# Patient Record
Sex: MALE | Employment: OTHER | URBAN - METROPOLITAN AREA
[De-identification: names, ages, dates, MRNs, and addresses within clinical notes are randomized per-mention and may not be internally consistent; named-entity substitution may affect disease eponyms.]

---

## 2017-03-29 ENCOUNTER — OFFICE VISIT - HEALTHEAST (OUTPATIENT)
Dept: INTERNAL MEDICINE | Facility: CLINIC | Age: 67
End: 2017-03-29

## 2017-03-29 DIAGNOSIS — M25.561 CHRONIC PAIN OF BOTH KNEES: ICD-10-CM

## 2017-03-29 DIAGNOSIS — Z00.00 HEALTH MAINTENANCE EXAMINATION: ICD-10-CM

## 2017-03-29 DIAGNOSIS — E78.00 PURE HYPERCHOLESTEROLEMIA: ICD-10-CM

## 2017-03-29 DIAGNOSIS — G47.01 INSOMNIA DUE TO MEDICAL CONDITION: ICD-10-CM

## 2017-03-29 DIAGNOSIS — J32.9 CHRONIC SINUSITIS, UNSPECIFIED LOCATION: ICD-10-CM

## 2017-03-29 DIAGNOSIS — G89.29 CHRONIC PAIN OF BOTH KNEES: ICD-10-CM

## 2017-03-29 DIAGNOSIS — M25.562 CHRONIC PAIN OF BOTH KNEES: ICD-10-CM

## 2017-03-29 LAB
CHOLEST SERPL-MCNC: 237 MG/DL
FASTING STATUS PATIENT QL REPORTED: YES
HDLC SERPL-MCNC: 51 MG/DL
LDLC SERPL CALC-MCNC: 156 MG/DL
PSA SERPL-MCNC: 2 NG/ML (ref 0–4.5)
TRIGL SERPL-MCNC: 148 MG/DL

## 2017-03-29 ASSESSMENT — MIFFLIN-ST. JEOR: SCORE: 1537.15

## 2017-04-07 ENCOUNTER — COMMUNICATION - HEALTHEAST (OUTPATIENT)
Dept: INTERNAL MEDICINE | Facility: CLINIC | Age: 67
End: 2017-04-07

## 2017-04-11 ENCOUNTER — AMBULATORY - HEALTHEAST (OUTPATIENT)
Dept: LAB | Facility: CLINIC | Age: 67
End: 2017-04-11

## 2017-04-11 ENCOUNTER — COMMUNICATION - HEALTHEAST (OUTPATIENT)
Dept: INTERNAL MEDICINE | Facility: CLINIC | Age: 67
End: 2017-04-11

## 2017-04-11 DIAGNOSIS — Z12.11 COLON CANCER SCREENING: ICD-10-CM

## 2017-04-21 ENCOUNTER — RECORDS - HEALTHEAST (OUTPATIENT)
Dept: ADMINISTRATIVE | Facility: OTHER | Age: 67
End: 2017-04-21

## 2017-04-21 ENCOUNTER — COMMUNICATION - HEALTHEAST (OUTPATIENT)
Dept: INTERNAL MEDICINE | Facility: CLINIC | Age: 67
End: 2017-04-21

## 2017-09-19 ENCOUNTER — OFFICE VISIT - HEALTHEAST (OUTPATIENT)
Dept: INTERNAL MEDICINE | Facility: CLINIC | Age: 67
End: 2017-09-19

## 2017-09-19 DIAGNOSIS — N40.1 BENIGN NON-NODULAR PROSTATIC HYPERPLASIA WITH LOWER URINARY TRACT SYMPTOMS: ICD-10-CM

## 2017-09-19 DIAGNOSIS — M54.6 CHRONIC BILATERAL THORACIC BACK PAIN: ICD-10-CM

## 2017-09-19 DIAGNOSIS — G89.29 CHRONIC BILATERAL THORACIC BACK PAIN: ICD-10-CM

## 2017-09-21 ENCOUNTER — COMMUNICATION - HEALTHEAST (OUTPATIENT)
Dept: INTERNAL MEDICINE | Facility: CLINIC | Age: 67
End: 2017-09-21

## 2017-09-26 ENCOUNTER — OFFICE VISIT - HEALTHEAST (OUTPATIENT)
Dept: PHYSICAL THERAPY | Facility: REHABILITATION | Age: 67
End: 2017-09-26

## 2017-09-26 DIAGNOSIS — M62.81 GENERALIZED MUSCLE WEAKNESS: ICD-10-CM

## 2017-09-26 DIAGNOSIS — M54.6 PAIN IN THORACIC SPINE: ICD-10-CM

## 2017-09-26 DIAGNOSIS — R29.3 POOR POSTURE: ICD-10-CM

## 2018-02-13 ENCOUNTER — OFFICE VISIT - HEALTHEAST (OUTPATIENT)
Dept: INTERNAL MEDICINE | Facility: CLINIC | Age: 68
End: 2018-02-13

## 2018-02-13 DIAGNOSIS — L91.8 SKIN TAG: ICD-10-CM

## 2018-02-13 DIAGNOSIS — Z12.11 COLON CANCER SCREENING: ICD-10-CM

## 2018-02-13 ASSESSMENT — MIFFLIN-ST. JEOR: SCORE: 1553.03

## 2018-02-14 LAB
LAB AP CHARGES (HE HISTORICAL CONVERSION): NORMAL
PATH REPORT.COMMENTS IMP SPEC: NORMAL
PATH REPORT.FINAL DX SPEC: NORMAL
PATH REPORT.GROSS SPEC: NORMAL
PATH REPORT.MICROSCOPIC SPEC OTHER STN: NORMAL
PATH REPORT.RELEVANT HX SPEC: NORMAL
RESULT FLAG (HE HISTORICAL CONVERSION): NORMAL

## 2018-02-15 ENCOUNTER — COMMUNICATION - HEALTHEAST (OUTPATIENT)
Dept: INTERNAL MEDICINE | Facility: CLINIC | Age: 68
End: 2018-02-15

## 2018-02-28 ENCOUNTER — COMMUNICATION - HEALTHEAST (OUTPATIENT)
Dept: INTERNAL MEDICINE | Facility: CLINIC | Age: 68
End: 2018-02-28

## 2018-04-10 ENCOUNTER — AMBULATORY - HEALTHEAST (OUTPATIENT)
Dept: LAB | Facility: CLINIC | Age: 68
End: 2018-04-10

## 2018-04-10 DIAGNOSIS — Z12.11 SCREEN FOR COLON CANCER: ICD-10-CM

## 2018-04-10 LAB
HEMOCCULT SP1 STL QL: NEGATIVE
HEMOCCULT SP2 STL QL: NEGATIVE
HEMOCCULT SP3 STL QL: NEGATIVE

## 2018-04-11 ENCOUNTER — COMMUNICATION - HEALTHEAST (OUTPATIENT)
Dept: INTERNAL MEDICINE | Facility: CLINIC | Age: 68
End: 2018-04-11

## 2018-05-14 ENCOUNTER — OFFICE VISIT - HEALTHEAST (OUTPATIENT)
Dept: INTERNAL MEDICINE | Facility: CLINIC | Age: 68
End: 2018-05-14

## 2018-05-14 ENCOUNTER — RECORDS - HEALTHEAST (OUTPATIENT)
Dept: ADMINISTRATIVE | Facility: OTHER | Age: 68
End: 2018-05-14

## 2018-05-14 DIAGNOSIS — Z12.5 SCREENING FOR MALIGNANT NEOPLASM OF PROSTATE: ICD-10-CM

## 2018-05-14 DIAGNOSIS — Z13.1 ENCOUNTER FOR SCREENING FOR DIABETES MELLITUS: ICD-10-CM

## 2018-05-14 DIAGNOSIS — Z13.220 ENCOUNTER FOR SCREENING FOR LIPOID DISORDERS: ICD-10-CM

## 2018-05-14 DIAGNOSIS — H91.90 HEARING LOSS: ICD-10-CM

## 2018-05-14 DIAGNOSIS — Z00.00 ROUTINE GENERAL MEDICAL EXAMINATION AT A HEALTH CARE FACILITY: ICD-10-CM

## 2018-05-14 DIAGNOSIS — L98.9 SKIN LESION: ICD-10-CM

## 2018-05-14 LAB
CHOLEST SERPL-MCNC: 218 MG/DL
FASTING STATUS PATIENT QL REPORTED: YES
FASTING STATUS PATIENT QL REPORTED: YES
GLUCOSE BLD-MCNC: 111 MG/DL (ref 70–99)
HDLC SERPL-MCNC: 56 MG/DL
LDLC SERPL CALC-MCNC: 149 MG/DL
PSA SERPL-MCNC: 0.9 NG/ML (ref 0–4.5)
TRIGL SERPL-MCNC: 64 MG/DL

## 2018-05-14 ASSESSMENT — MIFFLIN-ST. JEOR: SCORE: 1526.47

## 2018-05-16 ENCOUNTER — OFFICE VISIT - HEALTHEAST (OUTPATIENT)
Dept: AUDIOLOGY | Facility: CLINIC | Age: 68
End: 2018-05-16

## 2018-05-16 DIAGNOSIS — H90.3 SENSORINEURAL HEARING LOSS, BILATERAL: ICD-10-CM

## 2018-05-16 DIAGNOSIS — H92.01 EAR PAIN, RIGHT: ICD-10-CM

## 2018-05-16 DIAGNOSIS — H93.11 TINNITUS OF RIGHT EAR: ICD-10-CM

## 2018-05-18 ENCOUNTER — COMMUNICATION - HEALTHEAST (OUTPATIENT)
Dept: INTERNAL MEDICINE | Facility: CLINIC | Age: 68
End: 2018-05-18

## 2018-05-30 ENCOUNTER — OFFICE VISIT - HEALTHEAST (OUTPATIENT)
Dept: OTOLARYNGOLOGY | Facility: CLINIC | Age: 68
End: 2018-05-30

## 2018-05-30 DIAGNOSIS — H93.13 TINNITUS OF BOTH EARS: ICD-10-CM

## 2018-05-30 DIAGNOSIS — H90.3 SENSORINEURAL HEARING LOSS (SNHL) OF BOTH EARS: ICD-10-CM

## 2018-08-17 ENCOUNTER — OFFICE VISIT - HEALTHEAST (OUTPATIENT)
Dept: INTERNAL MEDICINE | Facility: CLINIC | Age: 68
End: 2018-08-17

## 2018-08-17 ENCOUNTER — COMMUNICATION - HEALTHEAST (OUTPATIENT)
Dept: SCHEDULING | Facility: CLINIC | Age: 68
End: 2018-08-17

## 2018-08-17 DIAGNOSIS — R30.0 DYSURIA: ICD-10-CM

## 2018-08-17 DIAGNOSIS — R26.89 LOSS OF BALANCE: ICD-10-CM

## 2018-08-17 LAB
ALBUMIN UR-MCNC: NEGATIVE MG/DL
ANION GAP SERPL CALCULATED.3IONS-SCNC: 14 MMOL/L (ref 5–18)
APPEARANCE UR: CLEAR
BILIRUB UR QL STRIP: NEGATIVE
BUN SERPL-MCNC: 13 MG/DL (ref 8–22)
CALCIUM SERPL-MCNC: 9.1 MG/DL (ref 8.5–10.5)
CHLORIDE BLD-SCNC: 93 MMOL/L (ref 98–107)
CO2 SERPL-SCNC: 21 MMOL/L (ref 22–31)
COLOR UR AUTO: YELLOW
CREAT SERPL-MCNC: 0.85 MG/DL (ref 0.7–1.3)
GFR SERPL CREATININE-BSD FRML MDRD: >60 ML/MIN/1.73M2
GLUCOSE BLD-MCNC: 102 MG/DL (ref 70–125)
GLUCOSE UR STRIP-MCNC: NEGATIVE MG/DL
HGB BLD-MCNC: 12.5 G/DL (ref 14–18)
HGB UR QL STRIP: NEGATIVE
KETONES UR STRIP-MCNC: NEGATIVE MG/DL
LEUKOCYTE ESTERASE UR QL STRIP: NEGATIVE
NITRATE UR QL: NEGATIVE
PH UR STRIP: 7.5 [PH] (ref 4.5–8)
POTASSIUM BLD-SCNC: 4.1 MMOL/L (ref 3.5–5)
SODIUM SERPL-SCNC: 128 MMOL/L (ref 136–145)
SP GR UR STRIP: 1.01 (ref 1–1.03)
UROBILINOGEN UR STRIP-ACNC: NORMAL

## 2018-08-17 ASSESSMENT — MIFFLIN-ST. JEOR: SCORE: 1535.52

## 2018-08-22 ENCOUNTER — COMMUNICATION - HEALTHEAST (OUTPATIENT)
Dept: INTERNAL MEDICINE | Facility: CLINIC | Age: 68
End: 2018-08-22

## 2018-08-28 ENCOUNTER — OFFICE VISIT - HEALTHEAST (OUTPATIENT)
Dept: INTERNAL MEDICINE | Facility: CLINIC | Age: 68
End: 2018-08-28

## 2018-08-28 DIAGNOSIS — E87.1 HYPONATREMIA: ICD-10-CM

## 2018-08-28 LAB
ANION GAP SERPL CALCULATED.3IONS-SCNC: 10 MMOL/L (ref 5–18)
BUN SERPL-MCNC: 17 MG/DL (ref 8–22)
CALCIUM SERPL-MCNC: 10.1 MG/DL (ref 8.5–10.5)
CHLORIDE BLD-SCNC: 100 MMOL/L (ref 98–107)
CO2 SERPL-SCNC: 25 MMOL/L (ref 22–31)
CREAT SERPL-MCNC: 0.95 MG/DL (ref 0.7–1.3)
GFR SERPL CREATININE-BSD FRML MDRD: >60 ML/MIN/1.73M2
GLUCOSE BLD-MCNC: 110 MG/DL (ref 70–125)
OSMOLALITY SERPL: 285 MOSM/KG (ref 270–300)
POTASSIUM BLD-SCNC: 4.3 MMOL/L (ref 3.5–5)
SODIUM SERPL-SCNC: 135 MMOL/L (ref 136–145)

## 2018-08-28 ASSESSMENT — MIFFLIN-ST. JEOR: SCORE: 1515.1

## 2018-08-29 ENCOUNTER — COMMUNICATION - HEALTHEAST (OUTPATIENT)
Dept: INTERNAL MEDICINE | Facility: CLINIC | Age: 68
End: 2018-08-29

## 2019-05-10 ENCOUNTER — COMMUNICATION - HEALTHEAST (OUTPATIENT)
Dept: INTERNAL MEDICINE | Facility: CLINIC | Age: 69
End: 2019-05-10

## 2019-05-10 DIAGNOSIS — E78.00 PURE HYPERCHOLESTEROLEMIA: ICD-10-CM

## 2019-05-11 ENCOUNTER — COMMUNICATION - HEALTHEAST (OUTPATIENT)
Dept: INTERNAL MEDICINE | Facility: CLINIC | Age: 69
End: 2019-05-11

## 2019-05-11 DIAGNOSIS — N40.1 BENIGN NON-NODULAR PROSTATIC HYPERPLASIA WITH LOWER URINARY TRACT SYMPTOMS: ICD-10-CM

## 2019-05-16 ENCOUNTER — OFFICE VISIT - HEALTHEAST (OUTPATIENT)
Dept: INTERNAL MEDICINE | Facility: CLINIC | Age: 69
End: 2019-05-16

## 2019-05-16 DIAGNOSIS — Z00.00 ROUTINE GENERAL MEDICAL EXAMINATION AT A HEALTH CARE FACILITY: ICD-10-CM

## 2019-05-16 DIAGNOSIS — R97.20 ELEVATED PROSTATE SPECIFIC ANTIGEN (PSA): ICD-10-CM

## 2019-05-16 DIAGNOSIS — E87.1 HYPONATREMIA: ICD-10-CM

## 2019-05-16 DIAGNOSIS — N40.1 BENIGN NON-NODULAR PROSTATIC HYPERPLASIA WITH LOWER URINARY TRACT SYMPTOMS: ICD-10-CM

## 2019-05-16 DIAGNOSIS — Z12.11 SCREEN FOR COLON CANCER: ICD-10-CM

## 2019-05-16 DIAGNOSIS — Z12.5 SCREENING FOR MALIGNANT NEOPLASM OF PROSTATE: ICD-10-CM

## 2019-05-16 DIAGNOSIS — E78.00 PURE HYPERCHOLESTEROLEMIA: ICD-10-CM

## 2019-05-16 LAB
ALBUMIN SERPL-MCNC: 4.3 G/DL (ref 3.5–5)
ALP SERPL-CCNC: 91 U/L (ref 45–120)
ALT SERPL W P-5'-P-CCNC: 20 U/L (ref 0–45)
ANION GAP SERPL CALCULATED.3IONS-SCNC: 11 MMOL/L (ref 5–18)
AST SERPL W P-5'-P-CCNC: 28 U/L (ref 0–40)
BILIRUB DIRECT SERPL-MCNC: 0.3 MG/DL
BILIRUB SERPL-MCNC: 0.9 MG/DL (ref 0–1)
BUN SERPL-MCNC: 26 MG/DL (ref 8–22)
CALCIUM SERPL-MCNC: 10.2 MG/DL (ref 8.5–10.5)
CHLORIDE BLD-SCNC: 99 MMOL/L (ref 98–107)
CO2 SERPL-SCNC: 24 MMOL/L (ref 22–31)
CREAT SERPL-MCNC: 1.12 MG/DL (ref 0.7–1.3)
ERYTHROCYTE [DISTWIDTH] IN BLOOD BY AUTOMATED COUNT: 11.2 % (ref 11–14.5)
GFR SERPL CREATININE-BSD FRML MDRD: >60 ML/MIN/1.73M2
GLUCOSE BLD-MCNC: 97 MG/DL (ref 70–125)
HCT VFR BLD AUTO: 39.6 % (ref 40–54)
HGB BLD-MCNC: 13.4 G/DL (ref 14–18)
LDLC SERPL CALC-MCNC: 86 MG/DL
MCH RBC QN AUTO: 33.5 PG (ref 27–34)
MCHC RBC AUTO-ENTMCNC: 33.8 G/DL (ref 32–36)
MCV RBC AUTO: 99 FL (ref 80–100)
PLATELET # BLD AUTO: 255 THOU/UL (ref 140–440)
PMV BLD AUTO: 6.5 FL (ref 7–10)
POTASSIUM BLD-SCNC: 4 MMOL/L (ref 3.5–5)
PROT SERPL-MCNC: 7.6 G/DL (ref 6–8)
PSA SERPL-MCNC: 5.5 NG/ML (ref 0–4.5)
RBC # BLD AUTO: 4 MILL/UL (ref 4.4–6.2)
SODIUM SERPL-SCNC: 134 MMOL/L (ref 136–145)
WBC: 6.1 THOU/UL (ref 4–11)

## 2019-05-16 ASSESSMENT — MIFFLIN-ST. JEOR
SCORE: 1476.11
SCORE: 1498.79

## 2019-05-17 ENCOUNTER — COMMUNICATION - HEALTHEAST (OUTPATIENT)
Dept: INTERNAL MEDICINE | Facility: CLINIC | Age: 69
End: 2019-05-17

## 2019-05-29 ENCOUNTER — COMMUNICATION - HEALTHEAST (OUTPATIENT)
Dept: INTERNAL MEDICINE | Facility: CLINIC | Age: 69
End: 2019-05-29

## 2019-05-29 DIAGNOSIS — Z12.11 COLON CANCER SCREENING: ICD-10-CM

## 2019-06-10 ENCOUNTER — RECORDS - HEALTHEAST (OUTPATIENT)
Dept: ADMINISTRATIVE | Facility: OTHER | Age: 69
End: 2019-06-10

## 2019-06-14 ENCOUNTER — AMBULATORY - HEALTHEAST (OUTPATIENT)
Dept: LAB | Facility: CLINIC | Age: 69
End: 2019-06-14

## 2019-06-14 DIAGNOSIS — R97.20 ELEVATED PROSTATE SPECIFIC ANTIGEN (PSA): ICD-10-CM

## 2019-06-18 ENCOUNTER — COMMUNICATION - HEALTHEAST (OUTPATIENT)
Dept: INTERNAL MEDICINE | Facility: CLINIC | Age: 69
End: 2019-06-18

## 2019-06-18 ENCOUNTER — AMBULATORY - HEALTHEAST (OUTPATIENT)
Dept: LAB | Facility: CLINIC | Age: 69
End: 2019-06-18

## 2019-06-18 DIAGNOSIS — Z12.11 COLON CANCER SCREENING: ICD-10-CM

## 2019-06-18 LAB
HEMOCCULT SP1 STL QL: NEGATIVE
HEMOCCULT SP2 STL QL: NEGATIVE
HEMOCCULT SP3 STL QL: NEGATIVE

## 2019-07-01 ENCOUNTER — OFFICE VISIT - HEALTHEAST (OUTPATIENT)
Dept: INTERNAL MEDICINE | Facility: CLINIC | Age: 69
End: 2019-07-01

## 2019-07-01 DIAGNOSIS — M54.12 CERVICAL RADICULOPATHY: ICD-10-CM

## 2019-07-01 DIAGNOSIS — R97.20 ELEVATED PROSTATE SPECIFIC ANTIGEN (PSA): ICD-10-CM

## 2019-07-01 LAB — PSA SERPL-MCNC: 1.2 NG/ML (ref 0–4.5)

## 2019-07-01 ASSESSMENT — MIFFLIN-ST. JEOR: SCORE: 1522.35

## 2019-07-02 ENCOUNTER — COMMUNICATION - HEALTHEAST (OUTPATIENT)
Dept: INTERNAL MEDICINE | Facility: CLINIC | Age: 69
End: 2019-07-02

## 2019-07-03 ENCOUNTER — COMMUNICATION - HEALTHEAST (OUTPATIENT)
Dept: INTERNAL MEDICINE | Facility: CLINIC | Age: 69
End: 2019-07-03

## 2019-07-29 ENCOUNTER — OFFICE VISIT - HEALTHEAST (OUTPATIENT)
Dept: INTERNAL MEDICINE | Facility: CLINIC | Age: 69
End: 2019-07-29

## 2019-07-29 DIAGNOSIS — Z01.818 PREOP EXAM FOR INTERNAL MEDICINE: ICD-10-CM

## 2019-07-29 ASSESSMENT — MIFFLIN-ST. JEOR: SCORE: 1512.37

## 2019-09-24 ENCOUNTER — OFFICE VISIT - HEALTHEAST (OUTPATIENT)
Dept: INTERNAL MEDICINE | Facility: CLINIC | Age: 69
End: 2019-09-24

## 2019-09-24 ENCOUNTER — COMMUNICATION - HEALTHEAST (OUTPATIENT)
Dept: SCHEDULING | Facility: CLINIC | Age: 69
End: 2019-09-24

## 2019-09-24 DIAGNOSIS — H10.31 ACUTE BACTERIAL CONJUNCTIVITIS OF RIGHT EYE: ICD-10-CM

## 2019-09-24 DIAGNOSIS — J20.9 ACUTE BRONCHITIS, UNSPECIFIED ORGANISM: ICD-10-CM

## 2019-09-24 ASSESSMENT — MIFFLIN-ST. JEOR: SCORE: 1507.32

## 2020-09-01 ENCOUNTER — COMMUNICATION - HEALTHEAST (OUTPATIENT)
Dept: INTERNAL MEDICINE | Facility: CLINIC | Age: 70
End: 2020-09-01

## 2020-09-01 DIAGNOSIS — N40.1 BENIGN NON-NODULAR PROSTATIC HYPERPLASIA WITH LOWER URINARY TRACT SYMPTOMS: ICD-10-CM

## 2020-09-01 DIAGNOSIS — E78.00 PURE HYPERCHOLESTEROLEMIA: ICD-10-CM

## 2020-09-17 ENCOUNTER — OFFICE VISIT - HEALTHEAST (OUTPATIENT)
Dept: INTERNAL MEDICINE | Facility: CLINIC | Age: 70
End: 2020-09-17

## 2020-09-17 DIAGNOSIS — Z11.59 NEED FOR HEPATITIS C SCREENING TEST: ICD-10-CM

## 2020-09-17 DIAGNOSIS — Z00.00 MEDICARE ANNUAL WELLNESS VISIT, SUBSEQUENT: ICD-10-CM

## 2020-09-17 DIAGNOSIS — E78.00 PURE HYPERCHOLESTEROLEMIA: ICD-10-CM

## 2020-09-17 DIAGNOSIS — M79.10 MUSCLE ACHE: ICD-10-CM

## 2020-09-17 DIAGNOSIS — Z12.5 PROSTATE CANCER SCREENING: ICD-10-CM

## 2020-09-17 DIAGNOSIS — D64.9 ANEMIA, UNSPECIFIED TYPE: ICD-10-CM

## 2020-09-17 DIAGNOSIS — Z12.11 SCREEN FOR COLON CANCER: ICD-10-CM

## 2020-09-17 DIAGNOSIS — R06.83 SNORING: ICD-10-CM

## 2020-09-17 ASSESSMENT — MIFFLIN-ST. JEOR: SCORE: 1531.7

## 2020-11-23 ENCOUNTER — COMMUNICATION - HEALTHEAST (OUTPATIENT)
Dept: INTERNAL MEDICINE | Facility: CLINIC | Age: 70
End: 2020-11-23

## 2020-11-23 DIAGNOSIS — E78.00 PURE HYPERCHOLESTEROLEMIA: ICD-10-CM

## 2020-11-23 DIAGNOSIS — N40.1 BENIGN NON-NODULAR PROSTATIC HYPERPLASIA WITH LOWER URINARY TRACT SYMPTOMS: ICD-10-CM

## 2021-02-22 ENCOUNTER — COMMUNICATION - HEALTHEAST (OUTPATIENT)
Dept: TELEHEALTH | Facility: CLINIC | Age: 71
End: 2021-02-22

## 2021-03-05 ENCOUNTER — COMMUNICATION - HEALTHEAST (OUTPATIENT)
Dept: INTERNAL MEDICINE | Facility: CLINIC | Age: 71
End: 2021-03-05

## 2021-03-05 DIAGNOSIS — N40.1 BENIGN NON-NODULAR PROSTATIC HYPERPLASIA WITH LOWER URINARY TRACT SYMPTOMS: ICD-10-CM

## 2021-03-05 DIAGNOSIS — E78.00 PURE HYPERCHOLESTEROLEMIA: ICD-10-CM

## 2021-03-05 RX ORDER — TAMSULOSIN HYDROCHLORIDE 0.4 MG/1
CAPSULE ORAL
Qty: 90 CAPSULE | Refills: 2 | Status: SHIPPED | OUTPATIENT
Start: 2021-03-05 | End: 2021-11-30

## 2021-03-05 RX ORDER — ATORVASTATIN CALCIUM 20 MG/1
TABLET, FILM COATED ORAL
Qty: 90 TABLET | Refills: 2 | Status: SHIPPED | OUTPATIENT
Start: 2021-03-05 | End: 2021-11-30

## 2021-05-28 ENCOUNTER — RECORDS - HEALTHEAST (OUTPATIENT)
Dept: ADMINISTRATIVE | Facility: CLINIC | Age: 71
End: 2021-05-28

## 2021-05-28 NOTE — TELEPHONE ENCOUNTER
Refill Approved    Rx renewed per Medication Renewal Policy. Medication was last renewed on 5/18/2018.    Brennen Moore, Care [facility] Triage/Med Refill 5/10/2019     Requested Prescriptions   Pending Prescriptions Disp Refills     atorvastatin (LIPITOR) 20 MG tablet [Pharmacy Med Name: ATORVASTATIN 20 MG TABLET] 90 tablet 3     Sig: TAKE 1 TABLET BY MOUTH EVERY DAY       Statins Refill Protocol (Hmg CoA Reductase Inhibitors) Passed - 5/10/2019 12:59 AM        Passed - PCP or prescribing provider visit in past 12 months      Last office visit with prescriber/PCP: 8/28/2018 Misbah Muniz DO OR same dept: 8/28/2018 Misbah Muniz DO OR same specialty: 8/28/2018 Misbah Muniz DO  Last physical: 5/14/2018 Last MTM visit: Visit date not found   Next visit within 3 mo: Visit date not found  Next physical within 3 mo: Visit date not found  Prescriber OR PCP: Misbah Muniz DO  Last diagnosis associated with med order: There are no diagnoses linked to this encounter.  If protocol passes may refill for 12 months if within 3 months of last provider visit (or a total of 15 months).

## 2021-05-28 NOTE — TELEPHONE ENCOUNTER
Refill Approved    Rx renewed per Medication Renewal Policy. Medication was last renewed on 2/28/18, last OV 8/28/18.    Eleni Gilmore, [facility] Triage/Med Refill 5/12/2019     Requested Prescriptions   Pending Prescriptions Disp Refills     tamsulosin (FLOMAX) 0.4 mg cap [Pharmacy Med Name: TAMSULOSIN HCL 0.4 MG CAPSULE] 30 capsule 10     Sig: TAKE 1 CAPSULE (0.4 MG TOTAL) BY MOUTH DAILY AFTER SUPPER.       Alfuzosin/Tamsulosin/Silodosin Refill Protocol  Passed - 5/11/2019  8:19 AM        Passed - PCP or prescribing provider visit in past 12 months       Last office visit with prescriber/PCP: 8/28/2018 Misbah Muniz DO OR same dept: 8/28/2018 Misbah Muniz DO OR same specialty: 8/28/2018 Misbah Muniz DO  Last physical: 5/14/2018 Last MTM visit: Visit date not found   Next visit within 3 mo: Visit date not found  Next physical within 3 mo: Visit date not found  Prescriber OR PCP: Misbah Muniz DO  Last diagnosis associated with med order: There are no diagnoses linked to this encounter.  If protocol passes may refill for 12 months if within 3 months of last provider visit (or a total of 15 months).

## 2021-05-28 NOTE — PROGRESS NOTES
Assessment and Plan:       1. Routine general medical examination at a health care facility  Overall, Mr. Sharp is healthy, but we did discuss his BMI which is in the overweight range.  I recommended 10 to 15 pound weight loss.  He will stay active by walking regularly.  No major medical problems today.  Check nonfasting labs including LDL to follow-up on atorvastatin.  1 year refills provided today.    2. Screen for colon cancer  We talked about all the options, he elected Cologuajonathon, which I think is reasonable.  No rectal bleeding or family history of colon cancer.  - Cologuard    3. Screening for malignant neoplasm of prostate  Urology referral due to PSA jump  Lab Results   Component Value Date    PSA 5.5 (H) 05/16/2019    PSA 0.9 05/14/2018    PSA 2.0 03/29/2017      - PSA (Prostatic-Specific Antigen), Annual Screen    4. Hyponatremia  - Basic Metabolic Panel    5. Essential Hypercholesterolemia  - Hepatic Profile  - HM2(CBC w/o Differential)  - LDL Cholesterol, Direct  - atorvastatin (LIPITOR) 20 MG tablet; Take 1 tablet (20 mg total) by mouth daily.  Dispense: 90 tablet; Refill: 3    6. Benign non-nodular prostatic hyperplasia with lower urinary tract symptoms  - tamsulosin (FLOMAX) 0.4 mg cap; Take 1 capsule (0.4 mg total) by mouth daily after supper.  Dispense: 90 capsule; Refill: 3        The patient's current medical problems were reviewed.    The following high BMI interventions were performed this visit: encouragement to exercise  The following health maintenance schedule was reviewed with the patient and provided in printed form in the after visit summary:   Health Maintenance   Topic Date Due     MAYLIN  06/03/2001     ZOSTER VACCINES (1 of 2) 08/16/2019 (Originally 6/3/2001)     INFLUENZA VACCINE RULE BASED (Season Ended) 08/01/2019     FALL RISK ASSESSMENT  05/16/2020     ADVANCE DIRECTIVES DISCUSSED WITH PATIENT  05/14/2023     TD 18+ HE  11/23/2025        Subjective:   Chief Complaint: Frank  Aron is an 68 y.o. male here for an Annual Wellness visit.   HPI:    Mr. Sharp is a 68-year-old man story of BPH who presents for annual wellness visit.    We had a discussion regarding colon cancer screening.  He typically does the occult blood cards.  We talked about other options including the gold standard of colonoscopy and also discussed Cologuard.    He walks at least a mile a day.  He watch TV, anxiety with friends to stay busy.  He is keeping his weight down, measures 170 pounds at home, 175 pounds here with his clothes on.    I last saw him late last summer for an acute illness which led to hyponatremia, when we rechecked it was normal.  The low sodium was initially diagnosed at the [facility] internal medicine clinic.    In regards to the BPH, urine stream is mostly strong, occasionally less strong, seems to vary with the water intake.        Review of Systems:  Please see above.  The rest of the review of systems are negative for all systems.    Patient Care Team:  Misbah Muniz DO as PCP - General     Patient Active Problem List   Diagnosis     Back Pain     Essential Hypercholesterolemia     Visual Impairment In The Right Eye     Chronic sinusitis     Insomnia due to medical condition     Chronic pain of both knees     Benign non-nodular prostatic hyperplasia with lower urinary tract symptoms     Hyponatremia     No past medical history on file.   No past surgical history on file.   Family History   Problem Relation Age of Onset     Diabetes Father      Diabetes Sister      Diabetes Brother      Diabetes Paternal Aunt      Diabetes Paternal Uncle       Social History     Socioeconomic History     Marital status: Single     Spouse name: Not on file     Number of children: Not on file     Years of education: Not on file     Highest education level: Not on file   Occupational History     Not on file   Social Needs     Financial resource strain: Not on file     Food insecurity:     Worry: Not on  "file     Inability: Not on file     Transportation needs:     Medical: Not on file     Non-medical: Not on file   Tobacco Use     Smoking status: Former Smoker     Smokeless tobacco: Former User   Substance and Sexual Activity     Alcohol use: Yes     Comment: None since the New Year     Drug use: No     Sexual activity: Not on file   Lifestyle     Physical activity:     Days per week: Not on file     Minutes per session: Not on file     Stress: Not on file   Relationships     Social connections:     Talks on phone: Not on file     Gets together: Not on file     Attends Judaism service: Not on file     Active member of club or organization: Not on file     Attends meetings of clubs or organizations: Not on file     Relationship status: Not on file     Intimate partner violence:     Fear of current or ex partner: Not on file     Emotionally abused: Not on file     Physically abused: Not on file     Forced sexual activity: Not on file   Other Topics Concern     Not on file   Social History Narrative    Former manual  on disability.        Current Outpatient Medications   Medication Sig Dispense Refill     atorvastatin (LIPITOR) 20 MG tablet Take 1 tablet (20 mg total) by mouth daily. 90 tablet 3     multivitamin with minerals (THERA-M) 9 mg iron-400 mcg Tab tablet Take 1 tablet by mouth daily.       tamsulosin (FLOMAX) 0.4 mg cap Take 1 capsule (0.4 mg total) by mouth daily after supper. 90 capsule 3     No current facility-administered medications for this visit.       Objective:   Vital Signs:   Visit Vitals  /74 (Patient Site: Left Arm, Patient Position: Sitting, Cuff Size: Adult Regular)   Pulse 96   Ht 5' 6.14\" (1.68 m)   Wt 170 lb (77.1 kg)   SpO2 94%   BMI 27.32 kg/m         VisionScreening:  No exam data present     PHYSICAL EXAM    General: alert, no distress  HEENT: sclerae anicteric, moist oral mucosa  Heart: Regular rate and rhythm, no murmurs.  No pretibial edema.  Warm extremities  Lungs: " Clear to auscultation bilat  Gastrointestinal: abdomen is soft, non-tender, non-distended.    Skin: warm/dry, no rashes  Neuro: no gross abnormalities      Assessment Results 5/16/2019   Activities of Daily Living No help needed   Instrumental Activities of Daily Living No help needed   Get Up and Go Score -   Mini Cog Total Score 5   Some recent data might be hidden     A Mini-Cog score of 0-2 suggests the possibility of dementia, score of 3-5 suggests no dementia    Identified Health Risks:     Information regarding advance directives (living mayo), including where he can download the appropriate form, was provided to the patient via the AVS.

## 2021-05-28 NOTE — TELEPHONE ENCOUNTER
Patient Returning Call  Reason for call:  Results  Information relayed to patient:  Writer read the following to patient per Dr Muniz: This can be a sign of prostate growth, inflammation, mild infection, amongst other things, but we should be sure that it is not a sign of developing prostate cancer.  To help with further workup, I will refer him to [facility].  Please let him know.  Patient has additional questions:  Yes  If YES, what are your questions/concerns:  How does he schedule follow up?  Writer shared speciality  will call within a few business days to give you that information.  No further questions.   Okay to leave a detailed message?: No call back needed

## 2021-05-28 NOTE — TELEPHONE ENCOUNTER
----- Message from Misbah Muniz DO sent at 5/17/2019  6:50 AM CDT -----  The labs look good other than the unexpectedly high PSA.  This can be a sign of prostate growth, inflammation, mild infection, amongst other things, but we should be sure that it is not a sign of developing prostate cancer.  To help with further workup, I will refer him to [facility].  Please let him know.

## 2021-05-29 ENCOUNTER — RECORDS - HEALTHEAST (OUTPATIENT)
Dept: ADMINISTRATIVE | Facility: CLINIC | Age: 71
End: 2021-05-29

## 2021-05-29 NOTE — TELEPHONE ENCOUNTER
Pt informed and states that he did talk with them and they were suppose to pick it up and he did talk with there customer service. Pt does not want to do the cologaurd. Pt would like the cards mailed to him. Hemoccult cards mailed to pt     PCP Please place order

## 2021-05-29 NOTE — TELEPHONE ENCOUNTER
Patient states he had the Cologuard kit ready for shipping back but [employer] did not pick it up so patient threw it away.  He now is asking to occult blood cards instead.  Please advise.    Orders being requested: occult blood stool cards  Reason service is needed/diagnosis: refusing Cologuard  When are orders needed by: asap  Where to send Orders: in chart and mail cards to address on chart.   Okay to leave detailed message?  No  [phone]

## 2021-05-29 NOTE — TELEPHONE ENCOUNTER
Let's have cologuard send a new one.  For [employer] to pick it up from his place, he needs to make sure to contact them and make the proper arrangements if he did not.  Otherwise can drop at [employer] drop site.  He will not get charged for a kit he discards.

## 2021-05-30 VITALS — HEIGHT: 66 IN | BODY MASS INDEX: 29.67 KG/M2 | WEIGHT: 184.6 LBS

## 2021-05-30 NOTE — PATIENT INSTRUCTIONS - HE
Ibuprofen (Advil/Motrin) 200-600 mg every 4 hours as needed    OR    Naproxen (Aleve) 220-440 two times a day as needed     X-ray neck    Referral to physical therapy     Check PSA today

## 2021-05-30 NOTE — PROGRESS NOTES
Preoperative Exam    Scheduled Procedure: left Cataract  Surgery Date:  08/27/19  Surgery Location: [facility], [place], fax [phone]    Surgeon:  Dr. Moon    Assessment/Plan:     1. Preop exam for internal medicine  Frank Sharp is a 69-year-old man who presents for preop risk assessment prior to left cataract extraction.  He is at low risk for this low risk procedure, no additional work-up is needed.  He is approved for surgery.        Surgical Procedure Risk: Low (reported cardiac risk generally < 1%)  Have you had prior anesthesia?: Yes  Have you or any family members had a previous anesthesia reaction:  No  Do you or any family members have a history of a clotting or bleeding disorder?: No  Cardiac Risk Assessment: no increased risk for major cardiac complications    APPROVAL GIVEN to proceed with proposed procedure, without further diagnostic evaluation      Functional Status: Independent  Patient plans to recover at home alone.     Subjective:      Frank Sharp is a 69 y.o. male who presents for a preoperative consultation.      He will have a left cataract surgery coming up next month.  He reports to have had right eye surgery in the past, he recovered smoothly from this and the surgical time was brief.  He has not had any chest pain or shortness of breath.  He has no history of coronary artery disease, stroke, chronic kidney disease, or diabetes.    At the last visit we talked about cervical radiculopathy, this resolved before he went to physical therapy so he canceled that.  His neck feels fine now.    All other systems reviewed and are negative, other than those listed in the HPI.    Pertinent History  Do you have difficulty breathing or chest pain after walking up a flight of stairs: No  History of obstructive sleep apnea: No  Steroid use in the last 6 months: No  Frequent Aspirin/NSAID use: No  Prior Blood Transfusion: No  Prior Blood Transfusion Reaction: UNknown  If for some reason  prior to, during or after the procedure, if it is medically indicated, would you be willing to have a blood transfusion?:  The patient REFUSES transfusion.    Current Outpatient Medications   Medication Sig Dispense Refill     atorvastatin (LIPITOR) 20 MG tablet Take 1 tablet (20 mg total) by mouth daily. 90 tablet 3     multivitamin with minerals (THERA-M) 9 mg iron-400 mcg Tab tablet Take 1 tablet by mouth daily.       tamsulosin (FLOMAX) 0.4 mg cap Take 1 capsule (0.4 mg total) by mouth daily after supper. 90 capsule 3     No current facility-administered medications for this visit.         No Known Allergies    Patient Active Problem List   Diagnosis     Back Pain     Essential Hypercholesterolemia     Visual Impairment In The Right Eye     Chronic sinusitis     Insomnia due to medical condition     Chronic pain of both knees     Benign non-nodular prostatic hyperplasia with lower urinary tract symptoms     Hyponatremia     Elevated prostate specific antigen (PSA)       History reviewed. No pertinent past medical history.    History reviewed. No pertinent surgical history.    Social History     Socioeconomic History     Marital status: Single     Spouse name: Not on file     Number of children: Not on file     Years of education: Not on file     Highest education level: Not on file   Occupational History     Not on file   Social Needs     Financial resource strain: Not on file     Food insecurity:     Worry: Not on file     Inability: Not on file     Transportation needs:     Medical: Not on file     Non-medical: Not on file   Tobacco Use     Smoking status: Former Smoker     Smokeless tobacco: Former User   Substance and Sexual Activity     Alcohol use: Yes     Comment: None since the New Year     Drug use: No     Sexual activity: Not on file   Lifestyle     Physical activity:     Days per week: Not on file     Minutes per session: Not on file     Stress: Not on file   Relationships     Social connections:      "Talks on phone: Not on file     Gets together: Not on file     Attends [employer] service: Not on file     Active member of club or organization: Not on file     Attends meetings of clubs or organizations: Not on file     Relationship status: Not on file     Intimate partner violence:     Fear of current or ex partner: Not on file     Emotionally abused: Not on file     Physically abused: Not on file     Forced sexual activity: Not on file   Other Topics Concern     Not on file   Social History Narrative    Former manual  on disability.         Patient Care Team:  Misbah Muniz DO as PCP - General          Objective:     Vitals:    07/29/19 1331   BP: 104/60   Pulse: 72   SpO2: 97%   Weight: 178 lb (80.7 kg)   Height: 5' 6.14\" (1.68 m)         Physical Exam:    General: alert, no distress  HEENT: sclerae anicteric, moist oral mucosa  Heart: Regular rate and rhythm, no murmurs.  No pretibial edema.    Lungs: Clear to auscultation bilat  Gastrointestinal: abdomen is non-distended.    Skin: warm/dry, no rashes  Neuro: no gross abnormalities  Psychiatric: Pleasant affect     Patient Instructions   Ok for surgery           Immunization History   Administered Date(s) Administered     Influenza [id number], 01/06/2010     Influenza high dose, seasonal 10/19/2016     Influenza, Seasonal, Inj PF IIV3 09/13/2010, 09/30/2015     Influenza, inj, historic,unspecified 10/21/1996, 12/23/1997, 11/17/2003, 10/01/2014, 10/01/2015, 10/01/2016     Influenza, seasonal,quad inj 36+ mos 10/02/2018     Influenza,seasonal, Inj IIV3 11/17/2005, 10/13/2006, 10/11/2011, 09/07/2012, 11/17/2014     Influenza,trivalent,im, 65+yrs 10/10/2017     Pneumo Conj 13-V (2010&after) 03/29/2017     Td, Adult, Absorbed 1951, 12/23/1997     Td,adult,historic,unspecified 1951     Tdap 11/23/2015           Electronically signed by Misbah Muniz DO 07/29/19 1:33 PM  "

## 2021-05-30 NOTE — PROGRESS NOTES
[facility] Note    Frank Sharp   69 y.o. male    Date of Visit: 7/1/2019  Chief Complaint   Patient presents with     Follow-up     [facility]     Neck Pain     tight, stiff, and achey       ASSESSMENT/PLAN  1. Cervical radiculopathy  XR Cervical Spine 2 - 3 VWS    Ambulatory referral to Physical Therapy   2. Elevated prostate specific antigen (PSA)       ---------------------------------------------    1.  Symptoms of neck stiffness, mild decreased range of motion with rotation of the neck, occasional radiculopathy down the arms to the wrists, consider degenerative disc disease or other degenerative change as cause.  Check x-ray, referral to physical therapy, advised on lowest effective doses of NSAIDs    2.  Elevated PSA of 5.5 which was more than 5 times his prior PSA, this was evaluated by Dr. Joseph Estrella, recommended follow-up PSA which we will complete today.  I will forward this result to Dr. Estrella.    Return in about 2 months (around 9/1/2019) for Recheck.      SUBJECTIVE    Frank Sharp is here for follow-up.    He has achy/tight feeling in the neck, going on for several weeks. Left side of neck.  No injury or heavy lifting.  Occasionally has radiation down both arms, though not into the hands.  He feels like the right hand is weaker than the left.    He saw Dr. Estrella, who recommended follow-up PSA after the new elevated PSA of 5.5.      ROS:   Per HPI, all other systems negative     Medications, allergies, and problem list were reviewed and updated    Patient Active Problem List   Diagnosis     Back Pain     Essential Hypercholesterolemia     Visual Impairment In The Right Eye     Chronic sinusitis     Insomnia due to medical condition     Chronic pain of both knees     Benign non-nodular prostatic hyperplasia with lower urinary tract symptoms     Hyponatremia     Elevated prostate specific antigen (PSA)     History reviewed. No pertinent past medical history.  Current Outpatient  "Medications   Medication Sig Dispense Refill     atorvastatin (LIPITOR) 20 MG tablet Take 1 tablet (20 mg total) by mouth daily. 90 tablet 3     tamsulosin (FLOMAX) 0.4 mg cap Take 1 capsule (0.4 mg total) by mouth daily after supper. 90 capsule 3     multivitamin with minerals (THERA-M) 9 mg iron-400 mcg Tab tablet Take 1 tablet by mouth daily.       No current facility-administered medications for this visit.      No Known Allergies    EXAM  Vitals:    07/01/19 1354   BP: 138/76   Patient Site: Left Arm   Patient Position: Sitting   Cuff Size: Adult Regular   Pulse: 78   SpO2: 95%   Weight: 180 lb 3.2 oz (81.7 kg)   Height: 5' 6.14\" (1.68 m)         General: Alert, no distress  Musculoskeletal: Rotation limited to about 60 degrees bilaterally, side bending to about 10 degrees bilaterally, full flexion and extension of the neck.  Spurling test is negative.  Neurologic: 1+ biceps and triceps reflexes bilaterally, full strength with shoulder abduction, elbow flexion/extension, finger abduction, and handgrip bilaterally.    Results reviewed: X-ray cervical spine ordered, follow-up PSA, reviewed note by Dr. Joseph Estrella recommending repeat PSA level.    Data points: 4    Misbah Muniz, DO  Internal Medicine  [facility]    "

## 2021-05-31 VITALS — WEIGHT: 176.3 LBS | BODY MASS INDEX: 28.89 KG/M2

## 2021-06-01 VITALS — BODY MASS INDEX: 29.25 KG/M2 | WEIGHT: 182 LBS | HEIGHT: 66 IN

## 2021-06-01 VITALS — BODY MASS INDEX: 30.23 KG/M2 | WEIGHT: 188.1 LBS | HEIGHT: 66 IN

## 2021-06-01 VITALS — WEIGHT: 177.5 LBS | HEIGHT: 66 IN | BODY MASS INDEX: 28.53 KG/M2

## 2021-06-01 VITALS — HEIGHT: 66 IN | BODY MASS INDEX: 28.93 KG/M2 | WEIGHT: 180 LBS

## 2021-06-01 NOTE — TELEPHONE ENCOUNTER
Triage call:     Patient is calling with sore throat and cold symptoms that have been present since 9/13/19. Reports sore throat is mild to moderate in pain and has been present since 9/13. Coughing up mucous- green/yellow mucous. Chest pain and tightness while coughing- otherwise no chest pain.  Reports a headache as well.     No fever. NO shortness of breath or rash.     Triaged to be seen today in the office- reviewed additional care advice with patient and he verbalizes understanding. Patient warm transferred to scheduling for appointment. Appointment scheduled for today @3:15 pm with PCP    Karen Rodgers RN [facility] Care Connection Triage/Med Refill 9/24/2019 11:15 AM     Reason for Disposition    Patient wants to be seen    Protocols used: COUGH-A-OH

## 2021-06-01 NOTE — PATIENT INSTRUCTIONS - HE
Patient Education     Acute Bronchitis  Your healthcare provider has told you that you have acute bronchitis. Bronchitis is infection or inflammation of the bronchial tubes (airways in the lungs). Normally, air moves easily in and out of the airways. Bronchitis narrows the airways, making it harder for air to flow in and out of the lungs. This causes symptoms such as shortness of breath, coughing up yellow or green mucus, and wheezing. Bronchitis can be acute or chronic. Acute means the condition comes on quickly and goes away in a short time, usually within 3 to 10 days. Chronic means a condition lasts a long time and often comes back.    What causes acute bronchitis?  Acute bronchitis almost always starts as a viral respiratory infection, such as a cold or the flu. Certain factors make it more likely for a cold or flu to turn into bronchitis. These include being very young, being elderly, having a heart or lung problem, or having a weak immune system. Cigarette smoking also makes bronchitis more likely.  When bronchitis develops, the airways become swollen. The airways may also become infected with bacteria. This is known as a secondary infection.  Diagnosing acute bronchitis  Your healthcare provider will examine you and ask about your symptoms and health history. You may also have a sputum culture to test the fluid in your lungs. Chest X-rays may be done to look for infection in the lungs.  Treating acute bronchitis  Bronchitis usually clears up as the cold or flu goes away. You can help feel better faster by doing the following:    Take medicine as directed. You may be told to take ibuprofen or other over-the-counter medicines. These help relieve inflammation in your bronchial tubes. Your healthcare provider may prescribe an inhaler to help open up the bronchial tubes. Most of the time, acute bronchitis is caused by a viral infection. Antibiotics are usually not prescribed for viral infections.    Drink plenty  of fluids, such as water, juice, or warm soup. Fluids loosen mucus so that you can cough it up. This helps you breathe more easily. Fluids also prevent dehydration.    Make sure you get plenty of rest.    Do not smoke. Do not allow anyone else to smoke in your home.  Recovery and follow-up  Follow up with your doctor as you are told. You will likely feel better in a week or two. But a dry cough can linger beyond that time. Let your doctor know if you still have symptoms (other than a dry cough) after 2 weeks, or if you re prone to getting bronchial infections. Take steps to protect yourself from future infections. These steps include stopping smoking and avoiding tobacco smoke, washing your hands often, and getting a yearly flu shot.  When to call your healthcare provider  Call the healthcare provider if you have any of the following:    Fever of 100.4 F (38.0 C) or higher, or as advised    Symptoms that get worse, or new symptoms    Trouble breathing    Symptoms that don t start to improve within a week, or within 3 days of taking antibiotics   Date Last Reviewed: 12/1/2016 2000-2019 [employer]. [address] [place] [address], [place] [address]. All rights reserved. This information is not intended as a substitute for professional medical care. Always follow your healthcare professional's instructions.

## 2021-06-01 NOTE — PROGRESS NOTES
[facility] Note    Frank Sharp   69 y.o. male    Date of Visit: 9/24/2019  Chief Complaint   Patient presents with     Cough     productive cough, insomnia       ASSESSMENT/PLAN  1. Acute bronchitis, unspecified organism  azithromycin (ZITHROMAX) 250 MG tablet    codeine-guaiFENesin (GUAIFENESIN AC)  mg/5 mL liquid    DISCONTINUED: codeine-guaiFENesin (GUAIFENESIN AC)  mg/5 mL liquid     ---------------------------------------------    1 Bronchitis: Bronchitis vs Pneumonia vs. URI; penumonia unlikely based on exam and clinical picture. Given the significance of his cough and sputum production and the [ersistence of his symptoms I recommend antibiotics + cough suppressant. Advised the patient that if symptoms worsen or if he develops a fever then should follow up again for assessment.    Return in about 3 months (around 12/24/2019).      SUBJECTIVE    Frank Sharp 68 yo male presents with cough for 1.5 weeks that is productive of green/yellow sputum. Cough is worse at night. He has chest pain and nausea associated with cough and almost coughing to the point of vomiting.   Positive for headache, throat pain, nasal congestion without runny nose.   He felt symptoms did improve but then they worsened again.   He had 1 day of feeling feverish but took advil and it subsided.   Sleep is significantly disrupted secondary to cough.      He has a friend that has been sick too but unsure if it is related to this.     Denies shortness of breath, ear pain, sinus pain, vomitting, diarrhea, constipation    Treatment includes: advil prn, mucinex had mild improvement       ROS:   Per HPI, all other systems negative     Medications, allergies, and problem list were reviewed and updated    Patient Active Problem List   Diagnosis     Back Pain     Essential Hypercholesterolemia     Visual Impairment In The Right Eye     Chronic sinusitis     Insomnia due to medical condition     Chronic pain of both knees      "Benign non-nodular prostatic hyperplasia with lower urinary tract symptoms     Hyponatremia     Elevated prostate specific antigen (PSA)     History reviewed. No pertinent past medical history.  Current Outpatient Medications   Medication Sig Dispense Refill     atorvastatin (LIPITOR) 20 MG tablet Take 1 tablet (20 mg total) by mouth daily. 90 tablet 3     multivitamin with minerals (THERA-M) 9 mg iron-400 mcg Tab tablet Take 1 tablet by mouth daily.       tamsulosin (FLOMAX) 0.4 mg cap Take 1 capsule (0.4 mg total) by mouth daily after supper. 90 capsule 3     azithromycin (ZITHROMAX) 250 MG tablet Take 500mg (2 tablets) day one, and then 250mg (1 tablet) days 2-5 6 tablet 0     codeine-guaiFENesin (GUAIFENESIN AC)  mg/5 mL liquid Take 5 mL by mouth 3 (three) times a day as needed for cough. 240 mL 0     No current facility-administered medications for this visit.      No Known Allergies    EXAM  Vitals:    09/24/19 1514   BP: 116/58   Patient Site: Left Arm   Patient Position: Sitting   Cuff Size: Adult Regular   Pulse: (!) 102   SpO2: 97%   Weight: 177 lb 6 oz (80.5 kg)   Height: 5' 6\" (1.676 m)     General: alert, oriented, no acute distress   Heart: RRR, no murmurs   Lungs: CTA, no increase work of breathing, coughing throughout exam   HENT: tympanic membranes clear, pharynx difficult to visualize secondary anatomy, no sinus tenderness     Addendum:   Attest above note by Ariana Martinez, MS3  Mr. Sharp has had overall 1.5 weeks of illness, thick green sputum coughing, interfering with sleep, some associated temperature at one point in the course of illness, and re-worsening of symptoms which signal potentially a more serious lower respiratory tract infection, bronchitis versus pneumonia.  Given the worsening of symptoms, tachycardia, as well as coughing almost to the point of vomiting, I think we should cover for Bordetella pertussis with azithromycin, also prescribed Cheratussin for cough which hopefully " will permit him to sleep.    Lungs: Clear to auscultation  ENT: Posterior pharynx difficult to visualize, otherwise normal  General: Appears mildly ill    Misbah Muniz,   Internal Medicine  [facility]

## 2021-06-03 VITALS
DIASTOLIC BLOOD PRESSURE: 58 MMHG | WEIGHT: 177.38 LBS | SYSTOLIC BLOOD PRESSURE: 116 MMHG | HEIGHT: 66 IN | BODY MASS INDEX: 28.51 KG/M2 | HEART RATE: 102 BPM | OXYGEN SATURATION: 97 %

## 2021-06-03 VITALS — WEIGHT: 180.2 LBS | HEIGHT: 66 IN | BODY MASS INDEX: 28.96 KG/M2

## 2021-06-03 VITALS — WEIGHT: 178 LBS | BODY MASS INDEX: 28.61 KG/M2 | HEIGHT: 66 IN

## 2021-06-03 VITALS — BODY MASS INDEX: 27.32 KG/M2 | HEIGHT: 66 IN | WEIGHT: 170 LBS

## 2021-06-09 NOTE — PROGRESS NOTES
Assessment and Plan:     1. Health maintenance examination  Frank Sharp is a 66-year-old man who presents for annual he brought up issues of left knee pain, mechanical symptoms, unclear if this is related to meniscus, but I am skeptical.  I advised him to return for follow-up on this, at which time we can do x-rays.  He is fasting currently, so we will check the tests outlined below.  I provided him stool cards for the annual occult blood testing, which was missed last year.  - Comprehensive Metabolic Panel  - PSA (Prostatic-Specific Antigen), Annual Screen  - Lipid Cascade    Addendum: ASCVD 10 year risk is 13.4%, 8.8% optimal RF's.  Add atorvastatin 10 mg daily, he agrees.  Asked to return in 3 months for labs (ALT, LDL).        2. Chronic sinusitis, unspecified location  He has chronic symptoms, seasonal symptoms as well, has some dry scratchy throat at times.  I recommended fluticasone nasal spray, which I hope can help a number of his symptoms including left ear pain (likely eustachian tube dysfunction)    3. Insomnia due to medical condition  He has a lot of trouble falling asleep and staying asleep.  A friend gave him 1 tablet of diazepam which helped him sleep.  He borrowed another medication, which did not help as much he does not remember what this is.  I advised him that we should try trazodone and asked if he is looking to try a medication.  Start with 50 mg, increase to 100 mg if not effective.    Addendum: 100 mg trazodone not helping much, he wakes up later.  Increase to 150 mg daily.  If no help, would change approach    4. Chronic pain of both knees  See problem #1, arthritis versus meniscus tear versus meniscus degeneration..     Noted eye problems, has follow-up with retinal surgery.    There are no diagnoses linked to this encounter.    The patient's current medical problems were reviewed.    The following health maintenance schedule was reviewed with the patient and provided in printed form  in the after visit summary:     Health Maintenance   Topic Date Due     COLONOSCOPY  06/03/1969     ZOSTER VACCINE  06/03/2011     PNEUMOCOCCAL POLYSACCHARIDE VACCINE AGE 65 AND OVER  06/03/2016     PNEUMOCOCCAL CONJUGATE VACCINE FOR ADULTS (PCV13 OR PREVNAR)  06/03/2016     FALL RISK ASSESSMENT  03/29/2018     ADVANCE DIRECTIVES DISCUSSED WITH PATIENT  01/21/2020     TD 18+ HE  11/23/2025     INFLUENZA VACCINE RULE BASED  Completed     TDAP ADULT ONE TIME DOSE  Completed        Subjective:   Chief Complaint: Frank Sharp is an 66 y.o. male here for an Annual Wellness visit.   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 He now has blurred and doubled vision. He now has blurry vision in his left eye as well.    Knee Pain: He has pain in his knees bilaterally, left worse than right. He noticed cracking or popping of his left knee sometimes while walking. He has difficulty bending his left knee. He denies any swelling of his knees. He thinks his right knee is bigger than his left knee and may be swollen.     Sleep Difficulties: He sleeps for 3-4 hours, and then he wakes up and has difficulty falling back to sleep. He has tried multiple sleep aid medications in the past. Valium was the only medication that has helped his sleep, and he was able to sleep for 8 hours, but he did not like it because it made his throat very dry. He has tried melatonin in the past, but this was not helpful for him. He is interested in trying other medications that will help him sleep.     Congestion: He reports constant nasal congestion. He uses a nasal saline spray. He denies any seasonal allergies, but notes he has been sneezing the last 3 days and has a dry throat.     Health Maintenance: He is due to repeat occult blood stool testing, which was last negative on 2/6/15.     Review of  Systems:  He has dryness in his throat, which occasionally causes difficulty swallowing and coughing when trying to clear his throat. He has pain in his upper back, and the same area is sometimes very itchy and bothersome for him.He has intermittent ringing in both his ears, and denies any dizziness. He has difficulty getting 4 servings of fruit and vegetables per day because he forgets to eats the fruit before it goes bad and he has to throw it away. Please see above. The rest of the review of systems are negative for all systems.    Patient Care Team:  Misbah Muniz DO as PCP - General     Patient Active Problem List   Diagnosis     Hyperlipidemia     Back Pain     Essential Hypercholesterolemia     Visual Impairment In The Right Eye     Chronic sinusitis     Insomnia due to medical condition     Chronic pain of both knees     No past medical history on file.   No past surgical history on file.   Family History   Problem Relation Age of Onset     Diabetes Father      Diabetes Sister      Diabetes Brother      Diabetes Paternal Aunt      Diabetes Paternal Uncle       Social History     Social History     Marital status: Single     Spouse name: N/A     Number of children: N/A     Years of education: N/A     Occupational History     Not on file.     Social History Main Topics     Smoking status: Former Smoker     Smokeless tobacco: Not on file     Alcohol use Yes      Comment: None since the New Year     Drug use: No     Sexual activity: Not on file     Other Topics Concern     Not on file     Social History Narrative    Former manual  on disability.        Current Outpatient Prescriptions   Medication Sig Dispense Refill     multivitamin with minerals (THERA-M) 9 mg iron-400 mcg Tab tablet Take 1 tablet by mouth daily.       prednisoLONE acetate (PRED-FORTE) 1 % ophthalmic suspension 1 drop. Use as directed       fluticasone (FLONASE) 50 mcg/actuation nasal spray 2 sprays into each nostril daily. 10 g  "11     traZODone (DESYREL) 50 MG tablet Take 1-2 tablets ( mg total) by mouth at bedtime. 60 tablet 0     No current facility-administered medications for this visit.       Objective:   Vital Signs:   Visit Vitals     /74     Pulse 80     Ht 5' 5.5\" (1.664 m)     Wt 184 lb 9.6 oz (83.7 kg)     BMI 30.25 kg/m2        VisionScreening:  No exam data present     PHYSICAL EXAM  General Appearance: Alert, cooperative, no distress, appears stated age  Head: Normocephalic, without obvious abnormality, atraumatic  Eyes: PERRL, conjunctiva clear, EOM's intact both eyes, left cornea cloudy, right cornea clear  Ears: Normal TM's and external ear canals, both ears  Nose: Nares normal, septum midline, mucosa normal  Throat: Lips, mucosa, and tongue normal; teeth and gums normal; Multiple dental fillings  Neck: Supple, symmetrical, trachea midline, no adenopathy; Thyroid: no enlargement/tenderness/nodules  Back: Symmetric, no curvature  Lungs: Clear to auscultation bilaterally, respirations unlabored  Heart: Regular rate and rhythm, S1 and S2 normal, no murmur, rub   or gallop  Abdomen: Soft, non-tender, bowel sounds active all four quadrants, no masses, no organomegaly, liver/spleen are normal to palpation     Extremities: Extremities normal, atraumatic, no cyanosis or edema. No joint line tenderness in knees bilaterally. Negative Aviva's test on the left. Full extension and flexion of left knee.   Skin: Skin color, texture, turgor normal, no rashes or lesions  Neurologic: CNII-XII intact    Assessment Results 3/29/2017   Activities of Daily Living No help needed   Instrumental Activities of Daily Living No help needed   Get Up and Go Score Less than 12 seconds   Mini Cog Total Score 2     A Mini-Cog score of 0-2 suggests the possibility of dementia, score of 3-5 suggests no dementia    Identified Health Risks:     The patient was counseled and encouraged to consider modifying their diet and eating habits. He was " provided with information on recommended healthy diet options.  Information regarding advance directives (living mayo), including where he can download the appropriate form, was provided to the patient via the AVS.     The patient was provided with appropriate referrals to address his memory problem.    The visit lasted a total of 21 minutes face to face with the patient. Over 50% of the time was spent counseling and educating the patient about sleep and knee pain.    IJacquelyn, am scribing for and in the presence of, Dr. Andrew Muniz.    I, Dr. Andrew Muniz, personally performed the services described in this documentation, as scribed by Jacquelyn Hawley in my presence, and it is both accurate and complete.

## 2021-06-11 NOTE — TELEPHONE ENCOUNTER
Refill Request  Did you contact pharmacy: Patient is aware he need an annual or med check.  He was transferred to scheduling to see who can see him.  In the interm he is totally out of atorvastatin  Medication name:   Requested Prescriptions     Pending Prescriptions Disp Refills     tamsulosin (FLOMAX) 0.4 mg cap 90 capsule 3     Sig: Take 1 capsule (0.4 mg total) by mouth daily after supper.     atorvastatin (LIPITOR) 20 MG tablet 90 tablet 3     Sig: Take 1 tablet (20 mg total) by mouth daily.     Who prescribed the medication: Dr Muniz  Requested Pharmacy: Asking for 90 day orders sent to "[facility].  Please advise.   Is patient out of medication: Yes  Patient notified refills processed in 3 business days:  yes  Okay to leave a detailed message: yes

## 2021-06-11 NOTE — PROGRESS NOTES
Assessment and Plan:     1. Medicare annual wellness visit, subsequent  Patient will come back for fasting blood work.  He did get flu shot.  Discussed colonoscopy.  Strongly encouraged him to cut back on alcohol.  - HM1(CBC and Differential); Future    2. Essential Hypercholesterolemia  He is on Lipitor 20 mg a day.  Has been having some muscle aches.  Previous LDL was less than 100.  He does not have any risk factors for heart disease.  Will repeat fasting cholesterol.  If LDL is fairly low we could decrease dose of Lipitor or he can take it every other day.  Will check CPK.  - Lipid [place] FASTING; Future  - Comprehensive Metabolic Panel; Future  - CK Total; Future    3. Need for hepatitis C screening test  - Hepatitis C Antibody (Anti-HCV); Future    4. Prostate cancer screening  Patient has had transiently elevated PSA last year at 5.5 and subsequent repeat one came back at 1.2.  Problems with urination is controlled on Flomax.  Discussed not to engage in sexual activity or biking couple days prior to test.    5. Screen for colon cancer  - Ambulatory referral for Colonoscopy    6. Anemia, unspecified type  He has had chronic anemia for the last 2 years.  It has been stable.  Stool Hemoccult was negative twice however his never had colonoscopy done.  Currently he does consume moderate amount of alcohol goal and I recommended that he stops that.  Discussed if he is still anemic I would recommend endoscopy in addition to colonoscopy due to alcohol intake.  - Iron and Transferrin Iron Binding Capacity; Future  - Vitamin B12; Future    7. Muscle ache  As above, most likely due to the Lipitor plus alcohol  - Comprehensive Metabolic Panel; Future  - CK Total; Future  - Thyroid Stimulating Hormone (TSH); Future    8. Snoring  I would recommend sleep apnea testing.  He does have extremely crowded oropharynx and has a lot of difficulty sleeping.  He is older only able to sleep on this side.  Discussed to stop  alcohol.  - Ambulatory referral to Sleep Medicine     The patient's current medical problems were reviewed.    The following health maintenance schedule was reviewed with the patient and provided in printed form in the after visit summary:   Health Maintenance   Topic Date Due     HEPATITIS C SCREENING  1950     ZOSTER VACCINES (1 of 2) 06/03/2001     MEDICARE ANNUAL WELLNESS VISIT  05/16/2020     FALL RISK ASSESSMENT  05/16/2020     COLORECTAL CANCER SCREENING  06/18/2020     LIPID  05/14/2023     ADVANCE CARE PLANNING  05/14/2023     TD 18+ HE  11/23/2025     INFLUENZA VACCINE RULE BASED  Completed     HEPATITIS B VACCINES  Aged Out     PNEUMOCOCCAL IMMUNIZATION 65+ LOW/MEDIUM RISK  Discontinued        Subjective:   Chief Complaint: Frank Sharp is an 70 y.o. male here for an Annual Wellness visit.     HPI:  Frank is a 70 y.o male, pt of Dr Hector, who is here for a physical. He has h/o hyperlipidemia, low back pain, BPH, history of transiently elevated PSA and moderate alcohol intake.    Patient reports that he has been trying to exercise and have been experiencing muscle aches.  He is on Lipitor 20 mg a day.  Discussed that it can be a side effect.  We will check his fasting cholesterol and CPK.  Since he has no heart related risk factors we may decrease dose of Lipitor where he can take it every other day based on blood work that he will do.    He also had episode of elevated PSA last year which was up to 5.5.  Repeat one several months later went down to 1.2.  Discussed avoid sexual activity and biking couple of days prior to test.    He also noted to have chronic mild anemia in the last 2 years.  He denies any abdominal pain or blood in stool.  Hemoccult test was -2 years in the row but patient has never had colonoscopy done.  He does consume about a bottle of red wine a day and we discussed that its too much.    Review of Systems: She sees ophthalmologist annually and has glasses and had cataract  surgeries in the past, he does not drive and takes public transportation.  Denies any problems with hearing, no recent falls.  No chest pains, palpitations with exertion, dizziness or shortness of breath.  No urinary problems.  He does take Flomax due to BPH.  Please see above.  The rest of the review of systems are negative for all systems.    Patient Care Team:  Misbah Muniz DO as PCP - General  Misbah Muniz DO as Assigned PCP     Patient Active Problem List   Diagnosis     Back Pain     Essential Hypercholesterolemia     Visual Impairment In The Right Eye     Chronic sinusitis     Insomnia due to medical condition     Chronic pain of both knees     Benign non-nodular prostatic hyperplasia with lower urinary tract symptoms     Hyponatremia     Elevated prostate specific antigen (PSA)     No past medical history on file.   No past surgical history on file.   Family History   Problem Relation Age of Onset     Diabetes Father      Diabetes Sister      Diabetes Brother      Diabetes Paternal Aunt      Diabetes Paternal Uncle       Social History     Socioeconomic History     Marital status: Single     Spouse name: Not on file     Number of children: Not on file     Years of education: Not on file     Highest education level: Not on file   Occupational History     Not on file   Social Needs     Financial resource strain: Not on file     Food insecurity     Worry: Not on file     Inability: Not on file     Transportation needs     Medical: Not on file     Non-medical: Not on file   Tobacco Use     Smoking status: Former Smoker     Smokeless tobacco: Former User   Substance and Sexual Activity     Alcohol use: Yes     Comment: None since the New Year     Drug use: No     Sexual activity: Not on file   Lifestyle     Physical activity     Days per week: Not on file     Minutes per session: Not on file     Stress: Not on file   Relationships     Social connections     Talks on phone: Not on file     Gets  "together: Not on file     Attends Nondenominational service: Not on file     Active member of club or organization: Not on file     Attends meetings of clubs or organizations: Not on file     Relationship status: Not on file     Intimate partner violence     Fear of current or ex partner: Not on file     Emotionally abused: Not on file     Physically abused: Not on file     Forced sexual activity: Not on file   Other Topics Concern     Not on file   Social History Narrative    Former manual  on disability.        Current Outpatient Medications   Medication Sig Dispense Refill     atorvastatin (LIPITOR) 20 MG tablet Take 1 tablet (20 mg total) by mouth daily. 90 tablet 0     tamsulosin (FLOMAX) 0.4 mg cap Take 1 capsule (0.4 mg total) by mouth daily after supper. 90 capsule 0     No current facility-administered medications for this visit.       Objective:   Vital Signs:   Visit Vitals  /62 (Patient Site: Right Arm, Patient Position: Sitting, Cuff Size: Adult Regular)   Pulse 66   Resp 16   Ht 5' 6\" (1.676 m)   Wt 182 lb 12 oz (82.9 kg)   SpO2 97%   BMI 29.50 kg/m           VisionScreening:  He wears glasses and sees ophthalmologist annually    PHYSICAL EXAM  General: well appearing male, alert and oriented x3  EYES: Eyelids, conjunctiva, and sclera were normal. Pupils were normal.   HEAD, EARS, NOSE, MOUTH, AND THROAT: no cervical LAD, no thyromegaly or nodules appreciated. TMs are visualized and normal, oropharynx is clear but extremely crowded  RESPIRATORY: respirations non labored, CTA bl, no wheezes, rales, no forced expiratory wheezing.  CARDIOVASCULAR: Heart rate and rhythm were normal. No murmurs, rubs,gallops. There was no peripheral edema. No carotid bruits.  GASTROINTESTINAL: Positive bowel sounds, abdomen is soft, non tender, non distended.     MUSCULOSKELETAL: Muscle mass was normal for age. No joint synovitis or deformity.  LYMPHATIC: There were no enlarged nodes palpable.  SKIN/HAIR/NAILS: Skin " color was normal.  No rashes.  NEUROLOGIC: The patient was alert and oriented.  Speech was normal.  There is no facial asymmetry.   PSYCHIATRIC:  Mood and affect were normal.   Rectal exam: No external hemorrhoids, prostate is enlarged without nodules, no masses in the rectum, no blood on the glove.      Assessment Results 9/17/2020   Activities of Daily Living No help needed   Instrumental Activities of Daily Living No help needed   Get Up and Go Score -   Mini Cog Total Score 5   Some recent data might be hidden     A Mini-Cog score of 0-2 suggests the possibility of dementia, score of 3-5 suggests no dementia      Identified Health Risks:     The patient was provided with suggestions to help him develop a healthy physical lifestyle.   The patient reports that he drinks more than one alcoholic drink per day but denies binge or excessive drinking. He was counseled and given information about possible harmful effects of excessive alcohol intake.  The patient was counseled and encouraged to consider modifying their diet and eating habits. He was provided with information on recommended healthy diet options.  Information regarding advance directives (living [place]), including where he can download the appropriate form, was provided to the patient via the AVS.

## 2021-06-11 NOTE — TELEPHONE ENCOUNTER
Former patient of Dr KAVITA Muniz  & has not established care with another provider.  Please assign refill request to covering provider per Clinic standard process.     RN cannot approve Refill Request    RN can NOT refill this medication no current PCP. . Last office visit: 9/24/2019 Misbah Muniz DO Last Physical: 7/29/2019 Last MTM visit: Visit date not found Last visit same specialty: 9/24/2019 Misbah Muniz DO.  Next visit within 3 mo: Visit date not found  Next physical within 3 mo: Visit date not found      Irene Lozano, [facility] Triage/Med Refill 9/1/2020    Requested Prescriptions   Pending Prescriptions Disp Refills     tamsulosin (FLOMAX) 0.4 mg cap 90 capsule 0     Sig: Take 1 capsule (0.4 mg total) by mouth daily after supper.       Alfuzosin/Tamsulosin/Silodosin Refill Protocol  Passed - 9/1/2020  3:08 PM        Passed - PCP or prescribing provider visit in past 12 months       Last office visit with prescriber/PCP: 9/24/2019 Misbah Muniz DO OR same dept: 9/24/2019 Misbah Muniz DO OR same specialty: 9/24/2019 Misbah Muniz DO  Last physical: 7/29/2019 Last MTM visit: Visit date not found   Next visit within 3 mo: Visit date not found  Next physical within 3 mo: Visit date not found  Prescriber OR PCP: Misbah Muniz DO  Last diagnosis associated with med order: 1. Benign non-nodular prostatic hyperplasia with lower urinary tract symptoms  - tamsulosin (FLOMAX) 0.4 mg cap; Take 1 capsule (0.4 mg total) by mouth daily after supper.  Dispense: 90 capsule; Refill: 3    2. Essential Hypercholesterolemia  - atorvastatin (LIPITOR) 20 MG tablet; Take 1 tablet (20 mg total) by mouth daily.  Dispense: 90 tablet; Refill: 3    If protocol passes may refill for 12 months if within 3 months of last provider visit (or a total of 15 months).                atorvastatin (LIPITOR) 20 MG tablet 90 tablet 0     Sig: Take 1 tablet (20 mg total) by mouth daily.        Statins Refill Protocol (Hmg CoA Reductase Inhibitors) Passed - 9/1/2020  3:08 PM        Passed - PCP or prescribing provider visit in past 12 months      Last office visit with prescriber/PCP: 9/24/2019 Misbah Muniz DO OR same dept: 9/24/2019 Misbah Muniz DO OR same specialty: 9/24/2019 Misbah Muniz DO  Last physical: 7/29/2019 Last MTM visit: Visit date not found   Next visit within 3 mo: Visit date not found  Next physical within 3 mo: Visit date not found  Prescriber OR PCP: Misbah Muniz DO  Last diagnosis associated with med order: 1. Benign non-nodular prostatic hyperplasia with lower urinary tract symptoms  - tamsulosin (FLOMAX) 0.4 mg cap; Take 1 capsule (0.4 mg total) by mouth daily after supper.  Dispense: 90 capsule; Refill: 3    2. Essential Hypercholesterolemia  - atorvastatin (LIPITOR) 20 MG tablet; Take 1 tablet (20 mg total) by mouth daily.  Dispense: 90 tablet; Refill: 3    If protocol passes may refill for 12 months if within 3 months of last provider visit (or a total of 15 months).

## 2021-06-13 NOTE — PROGRESS NOTES
[facility] Note    Frank Sharp   67 y.o. male    Date of Visit: 9/19/2017  Chief Complaint   Patient presents with     Back Pain     Kidney problem     Urinating too much @ HS       ASSESSMENT/PLAN  1. Chronic bilateral thoracic back pain  XR Thoracic Spine 3 VWS   2. Benign non-nodular prostatic hyperplasia with lower urinary tract symptoms  Urinalysis-UC if Indicated    CANCELED: PSA (Prostatic-Specific Antigen), Annual Screen     ---------------------------------------------    1.  Chronic back pain, check x-ray.  Consider gabapentin, muscle relaxant as medications which could help.  He likes to exercise, sometimes this makes the pain worse.  I would consider referral to physical therapy as well.  --PT referral. X-ray unremarkable    2.  Benign prostate hypertrophy with lower urinary tract symptoms.  Urinalysis negative for infection.  Trial Flomax at night.  PSA checked earlier this year, normal.    Return in about 2 months (around 11/19/2017), or if symptoms worsen or fail to improve, for Recheck.      SUBJECTIVE  Frank Sharp is a 67-year-old man who presents for ongoing left upper back pain, difficult with urination at night.    The left upper back pain is more chronic, has been present since the last visit for the annual wellness visit in March 2017.  He describes that pain medial to the left scapula, daily burning and tingling sensation.  Working out more seems to make it worse, relaxing seems to make it go away.  He sometimes gets it when he leans forward to read in a seated position.  No known injury.    For the last week, he has had urinary frequency at night, significant hesitation, having to wait for 20-40 seconds before being able to urinate, decreased strength of the stream.  This is primarily present at night, not so much during the day.      Medications, allergies, and problem list were reviewed and updated    Patient Active Problem List   Diagnosis     Back Pain     Essential  Hypercholesterolemia     Visual Impairment In The Right Eye     Chronic sinusitis     Insomnia due to medical condition     Chronic pain of both knees     Benign non-nodular prostatic hyperplasia with lower urinary tract symptoms     No past medical history on file.  Current Outpatient Prescriptions   Medication Sig Dispense Refill     atorvastatin (LIPITOR) 10 MG tablet Take 1 tablet (10 mg total) by mouth daily. 30 tablet 11     fluticasone (FLONASE) 50 mcg/actuation nasal spray 2 sprays into each nostril daily. 10 g 11     multivitamin with minerals (THERA-M) 9 mg iron-400 mcg Tab tablet Take 1 tablet by mouth daily.       tamsulosin (FLOMAX) 0.4 mg Cp24 Take 1 capsule (0.4 mg total) by mouth daily after supper. 30 capsule 11     No current facility-administered medications for this visit.      No Known Allergies    EXAM  Vitals:    09/19/17 1545   BP: 126/66   Pulse: 87   SpO2: 94%   Weight: 176 lb 4.8 oz (80 kg)     General: Alert, no distress  Musculoskeletal: Pain located medial to the left scapula near fourth through sixth ribs, not tender to palpation.  Full range of motion of the cervical spine, no pain caused with Spurling maneuver.  No pain with adduction or raising scapulae.  Rectal exam: Prostate diffusely enlarged, no nodules, no perianal lesions or rectal polyps.    X-ray of the thoracic spine reveals mild changes of spondylosis, no compression fracture evident.    Urinalysis normal    Misbah Muniz DO  Internal Medicine  [facility]

## 2021-06-13 NOTE — PROGRESS NOTES
[facility]   Cervical Thoracic Initial Evaluation    Patient Name: Frank Sharp  Date of evaluation: 9/26/2017  Referral Diagnosis: Chronic bilateral thoracic back pain  Referring provider: Misbah Muniz DO  Visit Diagnosis:     ICD-10-CM    1. Pain in thoracic spine M54.6    2. Poor posture R29.3    3. Generalized muscle weakness M62.81        Assessment:      Impairments in  pain, posture, ROM, joint mobility, strength  The POC is dynamic and will be modified on an ongoing basis.  Barriers to achieving goals as noted in the assessment section may affect outcome.  Prognosis to achieve goals is  good   Skilled PT is required to reduce pain and improve function.  Pt. is appropriate for skilled PT intervention as outlined in the Plan of Care (POC).  Pt. is a good candidate for skilled PT services to improve pain levels and function.   Pt presents with 1 year history of L thoracic spine and posterior scapula pain. He has limited thoracic and cervical AROM, poor seated posture and weakness throughout his scapular stabilizer. He also has hypomobility with central PAs throughout his thoracic spine. His pain limits his ability to sit, read, and sleep.     Goals:  Pt. will be independent with home exercise program in : 4 weeks (to self-manage pain and improve function)  Pt. will have improved quality of sleep: waking less times/night;in 12 weeks  Patient will sit: 30 minutes;in 12 weeks;with less pain;for driving  Patient will decrease : MARGIE score;for improved quality of function;for improved quality of life;in 12 weeks;by _ points  by ___ points: >5 pts    Patient's expectations/goals are realistic.    Barriers to Learning or Achieving Goals:  Chronicity of the problem.       Plan / Patient Instructions:        Plan of Care:   Authorization / Certification Start Date: 09/26/17  Authorization / Certification End Date: 12/25/17  Authorization / Certification Number of Visits: 12  Communication with:  "Referral Source  Patient Related Instruction: Nature of Condition;Treatment plan and rationale;Self Care instruction;Body mechanics;Expected outcome;Posture;Basis of treatment;Precautions;Next steps  Times per Week: 1-2  Number of Weeks: 12  Number of Visits: 12  Therapeutic Exercise: ROM;Stretching;Strengthening  Neuromuscular Reeducation: kinesio tape;posture;TNE;core  Manual Therapy: soft tissue mobilization;myofascial release;joint mobilization;muscle energy;strain counterstrain  Equipment: theraband    Plan for next visit: upper trapezius stretch, progress scapular strengthening, cervical retraction     Subjective:           History of Present Illness:    Frank is a 67 y.o. male who presents to therapy today with complaints of L thoracic spine pain (posterior scapula). Date of onset/duration of symptoms is about 1 year. Pt reports hitting his back on an angle iron. He was getting up and fell backwards onto the angle iron. It did not bother him for a long time. It started with intermittent pain and now is more constant.  Looking down while reading or sitting down increases his pain. Symptoms are intermittent and not improving. The pain is very \"bothersome\". He used to get pain down his left arm but has not occurred recently. He describes their previous level of function as not limited. He has been trying to exercise more recently- working on posture, neck exercises. He walks everyday for about 1 hour.    Pain Ratin  Pain rating at best: 0  Pain rating at worst: 5  Pain description: tingling and \"itching\", dull/acht    Functional limitations are described as occurring with:   Sleep - wakes 1-2x/night  Sitting 5-15 min    Has not tried ice or heat.        Objective:      Note: Items left blank indicates the item was not performed or not indicated at the time of the evaluation.    Patient Outcome Measures :    No Data Recorded   Scores range from 0-100%, where a score of 0% represents minimal pain and maximal " function. The minimal clinically important difference is a score reduction of 12%.    Cervical Thoracic Examination  1. Pain in thoracic spine     2. Poor posture     3. Generalized muscle weakness       Involved side: Left  Posture Observation:      General sitting posture is  fair.  Cervical:  Mild forward head  Shoulder/Thoracic complex: Mildly increased upper thoracic kyphosis    Cervical ROM:    Date: 9/26/2017     *Indicate scale AROM AROM AROM   Cervical Flexion WNL     Cervical Extension Mod with stiffness      Right Left Right Left Right Left   Cervical Sidebending mod mod       Cervical Rotation mod mod       Cervical Protraction      Cervical Retraction      Thoracic Flexion mild     Thoracic Extension mod     Thoracic Sidebending mild mild       Thoracic Rotation mod mod         Strength     Date: 9/26/2017     Cervical Myotomes/5 Right Left Right Left Right Left   Cervical Flexion (C1-2)         Cervical Sidebending (C3)         Shoulder Elevation (C4) 5 5       Shoulder Abduction (C5) 5 5       Elbow Flexion (C6) 5 5       Elbow Extension (C7) 5 5       Wrist Flexion (C7)         Wrist Extension (C6)         Thumb abduction (C8)         Finger Abduction (T1)         Shoulder IR: 5/5 yfn  Shoulder ER: 4/5 on L, 4+/5 on R  Sensation         Reflex Testing  Cervical Dermatomes Right Left UE Reflexes Right Left   Back of the Head (C2)   Biceps (C5-6)     Supraclavicular Fossa (C3)   Brachioradialis (C5-6)     AC Joint (C4)   Triceps (C7-8)     Lateral Biceps (C5)   Iftikhar s test     Palmar Thumb (C6)   LE Reflexes     Palmar 3rd Finger (C7)   Patellar (L3-4)     Palmar 5th Finger (C8)   Achilles (S1-2)     Ulnar Forearm (T1)   Babinski Response         Palpation: tenderness along the L scapular paraspinals    Passive Mobility-Joint Integrity: Hypomobile.    Cervical Special Tests     Cervical Special Tests Right Left UE Nerve Mobility Right Left   Cervical compression   Median nerve     Cervical  distraction   Ulnar nerve     Spurling s test   Radial nerve     Shoulder abduction sign   Thoracic outlet     Deep neck flexor endurance test   Mariusz Gloria-Roque -  Lynn s     Neer -  Cervical rotation lateral flexion     Infrspinatus -  Other:     Other:   Other:         Treatment Today     TREATMENT MINUTES COMMENTS   Evaluation 18 -thoracic spine   Self-care/ Home management     Manual therapy     Neuromuscular Re-education     Therapeutic Activity     Therapeutic Exercises 25 -see exercise flow sheet  -educated on POC, diagnosis and HEP  -educated on role of posture in relation to his pain   Gait training     Modality__________________                Total 43    Blank areas are intentional and mean the treatment did not include these items.     PT Evaluation Code: (Please list factors)  Patient History/Comorbidities: none  Examination: thoracic spine  Clinical Presentation: stable  Clinical Decision Making: low    Patient History/  Comorbidities Examination  (body structures and functions, activity limitations, and/or participation restrictions) Clinical Presentation Clinical Decision Making (Complexity)   No documented Comorbidities or personal factors 1-2 Elements Stable and/or uncomplicated Low   1-2 documented comorbidities or personal factor 3 Elements Evolving clinical presentation with changing characteristics Moderate   3-4 documented comorbidities or personal factors 4 or more Unstable and unpredictable High                Krista Mesa, PT, DPT  9/26/2017  11:26 AM               [facility] Discharge Summary    Patient was seen for 1 visit on 9/26/17with 0 missed appointments.  The patient discontinued therapy, did not return.  Goals not met as pt did not return to PT.    Therapy will be discontinued at this time.  The patient will need a new referral to resume.    Thank you for your referral.  Krista Mesa, PT, DPT  11/16/2017  10:01 AM

## 2021-06-13 NOTE — TELEPHONE ENCOUNTER
Refill Approved    Rx renewed per Medication Renewal Policy. Medication was last renewed on 9/2/20.    Mandy Wilson, [facility] Connection Triage/Med Refill 11/24/2020     Requested Prescriptions   Pending Prescriptions Disp Refills     tamsulosin (FLOMAX) 0.4 mg cap [Pharmacy Med Name: TAMSULOSIN HCL CAPS 0.4MG] 90 capsule 3     Sig: TAKE 1 CAPSULE DAILY AFTER SUPPER (DUE FOR OFFICE VISIT, CONTACT CLINIC FOR APPOINTMENT)       Alfuzosin/Tamsulosin/Silodosin Refill Protocol  Passed - 11/23/2020  2:32 PM        Passed - PCP or prescribing provider visit in past 12 months       Last office visit with prescriber/PCP: Visit date not found OR same dept: Visit date not found OR same specialty: 9/24/2019 Misbah Muniz DO  Last physical: 9/17/2020 Last MTM visit: Visit date not found   Next visit within 3 mo: Visit date not found  Next physical within 3 mo: Visit date not found  Prescriber OR PCP: Dona Sosa MD  Last diagnosis associated with med order: 1. Benign non-nodular prostatic hyperplasia with lower urinary tract symptoms  - tamsulosin (FLOMAX) 0.4 mg cap [Pharmacy Med Name: TAMSULOSIN HCL CAPS 0.4MG]; TAKE 1 CAPSULE DAILY AFTER SUPPER (DUE FOR OFFICE VISIT, CONTACT CLINIC FOR APPOINTMENT)  Dispense: 90 capsule; Refill: 3    2. Essential Hypercholesterolemia  - atorvastatin (LIPITOR) 20 MG tablet [Pharmacy Med Name: ATORVASTATIN TABS 20MG]; TAKE 1 TABLET DAILY (DUE FOR OFFICE VISIT, CONTACT CLINIC FOR APPOINTMENT)  Dispense: 90 tablet; Refill: 3    If protocol passes may refill for 12 months if within 3 months of last provider visit (or a total of 15 months).                atorvastatin (LIPITOR) 20 MG tablet [Pharmacy Med Name: ATORVASTATIN TABS 20MG] 90 tablet 3     Sig: TAKE 1 TABLET DAILY (DUE FOR OFFICE VISIT, CONTACT CLINIC FOR APPOINTMENT)       Statins Refill Protocol (Hmg CoA Reductase Inhibitors) Passed - 11/23/2020  2:32 PM        Passed - PCP or prescribing provider visit in past 12 months       Last office visit with prescriber/PCP: Visit date not found OR same dept: Visit date not found OR same specialty: 9/24/2019 Misbah Muniz DO  Last physical: 9/17/2020 Last MTM visit: Visit date not found   Next visit within 3 mo: Visit date not found  Next physical within 3 mo: Visit date not found  Prescriber OR PCP: Dona Sosa MD  Last diagnosis associated with med order: 1. Benign non-nodular prostatic hyperplasia with lower urinary tract symptoms  - tamsulosin (FLOMAX) 0.4 mg cap [Pharmacy Med Name: TAMSULOSIN HCL CAPS 0.4MG]; TAKE 1 CAPSULE DAILY AFTER SUPPER (DUE FOR OFFICE VISIT, CONTACT CLINIC FOR APPOINTMENT)  Dispense: 90 capsule; Refill: 3    2. Essential Hypercholesterolemia  - atorvastatin (LIPITOR) 20 MG tablet [Pharmacy Med Name: ATORVASTATIN TABS 20MG]; TAKE 1 TABLET DAILY (DUE FOR OFFICE VISIT, CONTACT CLINIC FOR APPOINTMENT)  Dispense: 90 tablet; Refill: 3    If protocol passes may refill for 12 months if within 3 months of last provider visit (or a total of 15 months).

## 2021-06-15 PROBLEM — J32.9 CHRONIC SINUSITIS: Status: ACTIVE | Noted: 2017-03-29

## 2021-06-15 PROBLEM — G89.29 CHRONIC PAIN OF BOTH KNEES: Status: ACTIVE | Noted: 2017-03-29

## 2021-06-15 PROBLEM — M25.561 CHRONIC PAIN OF BOTH KNEES: Status: ACTIVE | Noted: 2017-03-29

## 2021-06-15 PROBLEM — G47.01 INSOMNIA DUE TO MEDICAL CONDITION: Status: ACTIVE | Noted: 2017-03-29

## 2021-06-15 PROBLEM — M25.562 CHRONIC PAIN OF BOTH KNEES: Status: ACTIVE | Noted: 2017-03-29

## 2021-06-15 NOTE — TELEPHONE ENCOUNTER
Reason for Call:  Medication or medication refill:    Do you use a [facility]?  Name of the pharmacy and phone number for the current request:   [facility]    Name of the medication requested:   LIPITOR   FLOMAX    Other request: N/A    Can we leave a detailed message on this number? Yes    Phone number patient can be reached at: Cell number on file:    Telephone Information:   Mobile [phone]       Best Time: ANYTIME    Call taken on 3/5/2021 at 4:14 PM by Smita Hernandez

## 2021-06-16 PROBLEM — E87.1 HYPONATREMIA: Status: ACTIVE | Noted: 2018-08-28

## 2021-06-16 PROBLEM — N40.1 BENIGN NON-NODULAR PROSTATIC HYPERPLASIA WITH LOWER URINARY TRACT SYMPTOMS: Status: ACTIVE | Noted: 2017-09-19

## 2021-06-16 PROBLEM — R97.20 ELEVATED PROSTATE SPECIFIC ANTIGEN (PSA): Status: ACTIVE | Noted: 2019-05-17

## 2021-06-17 NOTE — PATIENT INSTRUCTIONS - HE
Patient Instructions by Misbah Muniz DO at 5/16/2019 12:20 PM     Author: Misbah Muniz DO Service: -- Author Type: Physician    Filed: 5/16/2019 12:32 PM Encounter Date: 5/16/2019 Status: Signed    : Misbah Muniz DO (Physician)         Patient Education   Understanding Advance Care Planning  Advance care planning is the process of deciding ones own future medical care. It helps ensure that if you cant speak for yourself, your wishes can still be carried out. The plan is a series of legal documents that note a persons wishes. The documents vary by state. Advance care planning may be done when a person has a serious illness that is expected to get worse. It may be done before major surgery. And it can help you and your family be prepared in case of a major illness or injury. Advance care planning helps with making decisions at these times.       A health care proxy is a person who acts as the voice of a patient when the patient cant speak for himself or herself. The name of this role varies by state. It may be called a Durable Medical Power of  or Durable Power of  for Healthcare. It may be called an agent, surrogate, or advocate. Or it may be called a representative or decision maker. It is an official duty that is identified by a legal document. The document also varies by state.    Why Is Advance Care Planning Important?  If a person communicates their healthcare wishes:    They will be given medical care that matches their values and goals.    Their family members will not be forced to make decisions in a crisis with no guidance.  Creating a Plan  Making an advance care plan is often done in 3 steps:    Thinking about ones wishes. To create an advance care plan, you should think about what kind of medical treatment you would want if you lose the ability to communicate. Are there any situations in which you would refuse or stop treatment? Are there therapies you  would want or not want? And whom do you want to make decisions for you? There are many places to learn more about how to plan for your care. Ask your doctor or  for resources.    Picking a health care proxy. This means choosing a trusted person to speak for you only when you cant speak for yourself. When you cannot make medical decisions, your proxy makes sure the instructions in your advance care plan are followed. A proxy does not make decisions based on his or her own opinions. They must put aside those opinions and values if needed, and carry out your wishes.    Filling out the legal documents. There are several kinds of legal documents for advance care planning. Each one tells health care providers your wishes. The documents may vary by state. They must be signed and may need to be witnessed or notarized. You can cancel or change them whenever you wish. Depending on your state, the documents may include a Healthcare Proxy form, Living Will, Durable Medical Power of , Advance Directive, or others.  The Familys Role  The best help a family can give is to support their loved ones wishes. Open and honest communication is vital. Family should express any concerns they have about the patients choices while the patient can still make decisions.    3258-5287 [employer]. [address] [place] [address], [place] [address]. All rights reserved. This information is not intended as a substitute for professional medical care. Always follow your healthcare professional's instructions.         Also, [employer] [place] offers a free, downloadable health care directive that allows you to share your treatment choices and personal preferences if you cannot communicate your wishes. It also allows you to appoint another person (called a health care agent) to make health care decisions if you are unable to do so. You can download an advance directive by going here:  [email or web address]     Patient Education   Personalized Prevention Plan  You are due for the preventive services outlined below.  Your care team is available to assist you in scheduling these services.  If you have already completed any of these items, please share that information with your care team to update in your medical record.  Health Maintenance   Topic Date Due   ? FECAL OCCULT BLOOD/FIT ANNUAL COLON CANCER SCREENING  04/10/2019   ? ZOSTER VACCINES (1 of 2) 08/16/2019 (Originally 6/3/2001)   ? INFLUENZA VACCINE RULE BASED (Season Ended) 08/01/2019   ? FALL RISK ASSESSMENT  05/16/2020   ? ADVANCE DIRECTIVES DISCUSSED WITH PATIENT  05/14/2023   ? TD 18+ HE  11/23/2025

## 2021-06-18 NOTE — PATIENT INSTRUCTIONS - HE
Patient Instructions by Dona Sosa MD at 9/17/2020 12:20 PM     Author: Dona Sosa MD Service: -- Author Type: Physician    Filed: 9/17/2020  2:01 PM Encounter Date: 9/17/2020 Status: Addendum    : Dona Sosa MD (Physician)    Related Notes: Original Note by Dona Sosa MD (Physician) filed at 9/17/2020  1:14 PM       1. Get shingles vaccine at the pharmacy ( 2 shots 2-6 months apart)    2. Come back for lab only appointment for fasting blood work.    3. Decreased alcohol to 1 glass of wine a day.    4. Have colonoscopy done. If blood work shows anemia, I will recommend doing endoscopy as well.    5. Flu shot today.    6. For sleeping problem, would recommend sleep clinic to evaluate for sleep apnea.  Patient Education     Your Health Risk Assessment indicates you feel you are not in good physical health.    A healthy lifestyle helps keep the body fit and the mind alert. It helps protect you from disease, helps you fight disease, and helps prevent chronic disease (disease that doesn't go away) from getting worse. This is important as you get older and begin to notice twinges in muscles and joints and a decline in the strength and stamina you once took for granted. A healthy lifestyle includes good healthcare, good nutrition, weight control, recreation, and regular exercise. Avoid harmful substances and do what you can to keep safe. Another part of a healthy lifestyle is stay mentally active and socially involved.    Good healthcare     Have a wellness visit every year.     If you have new symptoms, let us know right away. Don't wait until the next checkup.     Take medicines exactly as prescribed and keep your medicines in a safe place. Tell us if your medicine causes problems.   Healthy diet and weight control     Eat 3 or 4 small, nutritious, low-fat, high-fiber meals a day. Include a variety of fruits, vegetables, and whole-grain foods.     Make sure you get enough calcium  in your diet. Calcium, vitamin D, and exercise help prevent osteoporosis (bone thinning).     If you live alone, try eating with others when you can. That way you get a good meal and have company while you eat it.     Try to keep a healthy weight. If you eat more calories than your body uses for energy, it will be stored as fat and you will gain weight.     Recreation   Recreation is not limited to sports and team events. It includes any activity that provides relaxation, interest, enjoyment, and exercise. Recreation provides an outlet for physical, mental, and social energy. It can give a sense of worth and achievement. It can help you stay healthy.       Patient Education   Alcohol Use   Many people can enjoy a glass of wine or beer without any negative consequences to their health. According to the Centers for Disease Control and Prevention (CDC), having one or fewer drinks per day for women and two or fewer per day for men is considered moderate drinking.     When people drink more than moderately, it can become concerning. Excessive drinking is defined as consuming 15 drinks or more per week for men and 8 drinks or more per week for women. There are various health problems associated with excessive drinking, which include:    Damage to vital organs like the heart, brain, liver and pancreas    Harm to the digestive tract    Weaken the immune system    Higher risk for heart disease and cancer       Patient Education   Understanding USDA MyPlate  The USDA (US Department of Agriculture) has guidelines to help you make healthy food choices. These are called MyPlate. MyPlate shows the food groups that make up healthy meals using the image of a place setting. Before you eat, think about the healthiest choices for what to put onto your plate or into your cup or bowl. To learn more about building a healthy plate, visit www.choosemyplate.gov.       The Food Groups    Fruits: Any fruit or 100% fruit juice counts as part  of the Fruit Group. Fruits may be fresh, canned, frozen, or dried, and may be whole, cut-up, or pureed. Make half your plate fruits and vegetables.    Vegetables: Any vegetable or 100% vegetable juice counts as a member of the Vegetable Group. Vegetables may be fresh, frozen, canned, or dried. They can be served raw or cooked and may be whole, cut-up, or mashed. Make half your plate fruits and vegetables.     Grains: All foods made from grains are part of the Grains Group. These include wheat, rice, oats, cornmeal, and barley such as bread, pasta, oatmeal, cereal, tortillas, and grits. Grains should be no more than a quarter of your plate. At least half of your grains should be whole grains.    Protein: This group includes meat, poultry, seafood, beans and peas, eggs, processed soy products (like tofu), nuts (including nut butters), and seeds. Make protein choices no more than a quarter of your plate. Meat and poultry choices should be lean or low fat.    Dairy: All fluid milk products and foods made from milk that contain calcium, like yogurt and cheese are part of the Dairy Group. (Foods that have little calcium, such as cream, butter, and cream cheese, are not part of the group.) Most dairy choices should be low-fat or fat-free.    Oils: These are fats that are liquid at room temperature. They include canola, corn, olive, soybean, and sunflower oil. Foods that are mainly oil include mayonnaise, certain salad dressings, and soft margarines. You should have only 5 to 7 teaspoons of oils a day. You probably already get this much from the food you eat.  Use [employer] to Help Build Your Meals  The [employer]cker can help you plan and track your meals and activity. You can look up individual foods to see or compare their nutritional value. You can get guidelines for what and how much you should eat. You can compare your food choices. And you can assess personal physical activities and see ways you can improve. Go to  www.JoggleBugmyplate.gov/supertracker/.    1387-7070 [employer]. [address] [place] [address], [place] [address]. All rights reserved. This information is not intended as a substitute for professional medical care. Always follow your healthcare professional's instructions.           Patient Education   Understanding Advance Care Planning  Advance care planning is the process of deciding ones own future medical care. It helps ensure that if you cant speak for yourself, your wishes can still be carried out. The plan is a series of legal documents that note a persons wishes. The documents vary by state. Advance care planning may be done when a person has a serious illness that is expected to get worse. It may be done before major surgery. And it can help you and your family be prepared in case of a major illness or injury. Advance care planning helps with making decisions at these times.       A health care proxy is a person who acts as the voice of a patient when the patient cant speak for himself or herself. The name of this role varies by state. It may be called a Durable Medical Power of  or Durable Power of  for Healthcare. It may be called an agent, surrogate, or advocate. Or it may be called a representative or decision maker. It is an official duty that is identified by a legal document. The document also varies by state.    Why Is Advance Care Planning Important?  If a person communicates their healthcare wishes:    They will be given medical care that matches their values and goals.    Their family members will not be forced to make decisions in a crisis with no guidance.  Creating a Plan  Making an advance care plan is often done in 3 steps:    Thinking about ones wishes. To create an advance care plan, you should think about what kind of medical treatment you would want if you lose the ability to communicate. Are there any situations in which you would refuse or stop treatment? Are  there therapies you would want or not want? And whom do you want to make decisions for you? There are many places to learn more about how to plan for your care. Ask your doctor or  for resources.    Picking a health care proxy. This means choosing a trusted person to speak for you only when you cant speak for yourself. When you cannot make medical decisions, your proxy makes sure the instructions in your advance care plan are followed. A proxy does not make decisions based on his or her own opinions. They must put aside those opinions and values if needed, and carry out your wishes.    Filling out the legal documents. There are several kinds of legal documents for advance care planning. Each one tells health care providers your wishes. The documents may vary by state. They must be signed and may need to be witnessed or notarized. You can cancel or change them whenever you wish. Depending on your state, the documents may include a Healthcare Proxy form, Living Will, Durable Medical Power of , Advance Directive, or others.  The Familys Role  The best help a family can give is to support their loved ones wishes. Open and honest communication is vital. Family should express any concerns they have about the patients choices while the patient can still make decisions.    6081-9859 [employer]. [address] [place] [address], [place] [address]. All rights reserved. This information is not intended as a substitute for professional medical care. Always follow your healthcare professional's instructions.         Also, [employer] [place] offers a free, downloadable health care directive that allows you to share your treatment choices and personal preferences if you cannot communicate your wishes. It also allows you to appoint another person (called a health care agent) to make health care decisions if you are unable to do so. You can download an advance directive by going here:  [email or web address]     Patient Education   Personalized Prevention Plan  You are due for the preventive services outlined below.  Your care team is available to assist you in scheduling these services.  If you have already completed any of these items, please share that information with your care team to update in your medical record.  Health Maintenance   Topic Date Due   ? HEPATITIS C SCREENING  1950   ? ZOSTER VACCINES (1 of 2) 06/03/2001   ? MEDICARE ANNUAL WELLNESS VISIT  05/16/2020   ? FALL RISK ASSESSMENT  05/16/2020   ? COLORECTAL CANCER SCREENING  06/18/2020   ? LIPID  05/14/2023   ? ADVANCE CARE PLANNING  05/14/2023   ? TD 18+ HE  11/23/2025   ? INFLUENZA VACCINE RULE BASED  Completed   ? HEPATITIS B VACCINES  Aged Out   ? PNEUMOCOCCAL IMMUNIZATION 65+ LOW/MEDIUM RISK  Discontinued

## 2021-06-18 NOTE — PROGRESS NOTES
Assessment and Plan:       1. Routine general medical examination at a health care facility  Frank Sharp is a 67-year-old man who presents for annual wellness visit.  BMI is 29.  Screen for diabetes as well as hyperlipidemia follow-up.  He is on atorvastatin.  Encouraged to eat more fruits and vegetables, which he does not do very much, but occasionally does smoothies    2. Encounter for screening for diabetes mellitus  - Glucose    3. Encounter for screening for lipoid disorders  Increase lipitor to 20 mg daily  - Lipid [place], FASTING    4. Screening for malignant neoplasm of prostate  - PSA (Prostatic-Specific Antigen), Annual Screen    5. Skin lesion  He has a pearly papule on the left side of his nose.  I have a relatively low suspicion of basal cell carcinoma, but should be evaluated further, he would like to selectively removed.  - Ambulatory referral to Dermatology    6. Hearing loss  He complains of some hearing loss, Ms. hearing people.  He would like a hearing screen.  - Ambulatory referral to Audiology     The patient's current medical problems were reviewed.    I have had an Advance Directives discussion with the patient.  He has not completed one yet, but he will, packet provided for him as well as explanation.    The following health maintenance schedule was reviewed with the patient and provided in printed form in the after visit summary:   Health Maintenance   Topic Date Due     ZOSTER VACCINE  06/03/2011     FALL RISK ASSESSMENT  02/13/2019     FECAL OCCULT BLOOD/FIT ANNUAL COLON CANCER SCREENING  04/10/2019     ADVANCE DIRECTIVES DISCUSSED WITH PATIENT  01/21/2020     TD 18+ HE  11/23/2025     INFLUENZA VACCINE RULE BASED  Completed        Subjective:   Chief Complaint: Frank Sharp is an 67 y.o. male here for an Annual Wellness visit.     HPI: Today is the annual wellness visit, and Mr. Sharp does not have any particular health concerns.  Based on our discussion with the screening  questionnaire, he is not eating 4 servings of fruit and vegetables every day, but is intermittently getting protein shakes with fruit smoothie ingredients.  We talked about doing frozen vegetables since they last longer than the fresh vegetables and do not have the preservatives.    He has had some trouble with his hearing, so he would like a hearing screen.  He also has a papule on the left side of his nose, would be interested in getting this out.  He had a lot of dental work done on him that he did not agree to, he keeps getting a bill for the dentist, but the insurance company tells him that he does not O any money, so he has not been back.  I encouraged him to get yearly dental screenings.  I also recommended that he follow-up with an eye doctor, last visit was 2012.  No family history of glaucoma.    Colon cancer screening up-to-date, he does occult blood cards.    He would like to get screened for diabetes today.    He has been taking the prostate medication, no longer has any hesitation, or other lower urinary tract symptoms.    At the last visit, skin tags removed.  He did not mention any complications from this procedure.    Review of Systems:    Please see above.  The rest of the review of systems are negative for all systems.    Patient Care Team:  Misbah Muniz DO as PCP - General     Patient Active Problem List   Diagnosis     Back Pain     Essential Hypercholesterolemia     Visual Impairment In The Right Eye     Chronic sinusitis     Insomnia due to medical condition     Chronic pain of both knees     Benign non-nodular prostatic hyperplasia with lower urinary tract symptoms     No past medical history on file.   No past surgical history on file.   Family History   Problem Relation Age of Onset     Diabetes Father      Diabetes Sister      Diabetes Brother      Diabetes Paternal Aunt      Diabetes Paternal Uncle       Social History     Social History     Marital status: Single     Spouse name:  "N/A     Number of children: N/A     Years of education: N/A     Occupational History     Not on file.     Social History Main Topics     Smoking status: Former Smoker     Smokeless tobacco: Former User     Alcohol use Yes      Comment: None since the New Year     Drug use: No     Sexual activity: Not on file     Other Topics Concern     Not on file     Social History Narrative    Former manual  on disability.        Current Outpatient Prescriptions   Medication Sig Dispense Refill     tamsulosin (FLOMAX) 0.4 mg Cp24 Take 1 capsule (0.4 mg total) by mouth daily after supper. 30 capsule 11     atorvastatin (LIPITOR) 10 MG tablet Take 1 tablet (10 mg total) by mouth daily. 30 tablet 11     fluticasone (FLONASE) 50 mcg/actuation nasal spray 2 sprays into each nostril daily. 10 g 11     multivitamin with minerals (THERA-M) 9 mg iron-400 mcg Tab tablet Take 1 tablet by mouth daily.       No current facility-administered medications for this visit.       Objective:   Vital Signs:   Visit Vitals     BP 98/56 (Patient Site: Left Arm, Patient Position: Sitting, Cuff Size: Adult Regular)     Pulse 84     Temp 97.6  F (36.4  C) (Tympanic)     Ht 5' 6.14\" (1.68 m)     Wt 180 lb (81.6 kg)     SpO2 98%     BMI 28.93 kg/m2        VisionScreening:  No exam data present     PHYSICAL EXAM    General: alert, no distress  HEENT: sclerae anicteric, moist oral mucosa  Heart: Regular rate and rhythm, no murmurs.  No pretibial edema.  Warm extremities  Lungs: Clear to auscultation bilat  Gastrointestinal: abdomen is soft, non-tender, non-distended.    Skin: warm/dry, no rashes  Neuro: no gross abnormalities         Assessment Results 5/14/2018   Activities of Daily Living No help needed   Instrumental Activities of Daily Living No help needed   Get Up and Go Score Less than 12 seconds   Mini Cog Total Score 4   Some recent data might be hidden     A Mini-Cog score of 0-2 suggests the possibility of dementia, score of 3-5 suggests no " dementia    Identified Health Risks:     The patient reports that he drinks more than one alcoholic drink per day and sometimes engages in binge or excessive drinking. He was counseled and given information about possible harmful effects of excessive alcohol intake as well as where to get help for alcohol problems.  The patient was counseled and encouraged to consider modifying their diet and eating habits. He was provided with information on recommended healthy diet options.  The patient was provided with written information regarding signs of hearing loss.  Information regarding advance directives (living mayo), including where he can download the appropriate form, was provided to the patient via the AVS.

## 2021-06-18 NOTE — PROGRESS NOTES
Audiology Report:    Referring Provider:  Dr. Misbah Muniz    History:  Frank Sharp is seen today for comprehensive hearing evaluation. He has a history of difficulty hearing bilaterally. He states he has trouble understanding things when watching television and he sometimes needs people to repeat themselves. Frank reportedly experiences intermittent otalgia and tinnitus in his right ear. 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  He denies a history of otorrhea, aural fullness, dizziness, ear surgery, and family history of hearing loss.    Results:     Left Ear Right Ear   Otoscopy clear canals clear canals   Pure Tone Audiometry normal hearing from 250-2000 Hz sloping to moderately-severe sensorineural hearing loss  normal hearing from 250-2000 Hz sloping to moderately-severe sensorineural hearing loss    Word Recognition excellent excellent   Tympanometry normal (Type A)  normal (Type A)     Transducer: Circumaural headphones    Reliability was good  and there was good  SRT to PTA agreement.       Plan:  Results are discussed in detail. Frank is scheduled to follow-up with Dr. Blackwell, ENT due to intermittent otalgia and tinnitus in the right ear. He should return for retesting in 1-2 years. He is a borderline candidate for hearing aids at this time, but he doesn't seem interested in pursuing hearing aids right now.    Please see audiogram under  media  and  audiogram  in the patient s chart.     Tyler Hyde, CCC-A  [place] Licensed Audiologist #[id number]

## 2021-06-18 NOTE — PROGRESS NOTES
Frank Sharp is a 67 y.o. male seen in consultation at the request of Dr. Muniz for bilateral non-pulsatile tinnitus and hearing loss.  Patient has noticed gradual hearing loss over 2 years.  Denies otologic history of infections or surgeries. Denies vertigo.  Family history of hearing loss - no.  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       ALLERGY:  No Known Allergies    MEDICATIONS:     Current Outpatient Prescriptions on File Prior to Visit   Medication Sig Dispense Refill     atorvastatin (LIPITOR) 20 MG tablet Take 1 tablet (20 mg total) by mouth daily. 90 tablet 3     fluticasone (FLONASE) 50 mcg/actuation nasal spray 2 sprays into each nostril daily. 10 g 11     multivitamin with minerals (THERA-M) 9 mg iron-400 mcg Tab tablet Take 1 tablet by mouth daily.       tamsulosin (FLOMAX) 0.4 mg Cp24 Take 1 capsule (0.4 mg total) by mouth daily after supper. 30 capsule 11     No current facility-administered medications on file prior to visit.        Past Medical/Surgical History, Family History and Social History reviewed in detail and documented separately in the medical record.    Complete Review of Systems:  A 10-point review was performed.  Pertinent positives are noted in the HPI and on a separate scanned document in the chart.    EXAM:  There were no vitals filed for this visit.    Nurse documentation reviewed  and documented separately.    General Appearance: Pleasant, alert, appropriate appearance for age. No acute distress    Head Exam: Normal. Normocephalic, atraumatic.    Eye Exam: Normal external eye, conjunctiva, lids, cornea. Extra-ocular movements are intact.    Left external ear: normal  Left otoscopic exam: Normal EAC. Normal TM     Right external ear: normal  Right otoscopic exam: Normal EAC. Normal TM    Nose Exam: Normal external nose. Septum midline. Nasal mucosa normal.  Inferior turbinates normal.    OroPharynx Exam: Dental hygiene adequate. Normal tongue. Normal buccal mucosa.  Normal palate.  Normal pharynx. Normal tonsils.    Neck Exam: Supple, no masses or nodes. Trachea and larynx midline.    Thyroid Exam: No tenderness, nodules or enlargement.    Salivary Glands: nontender without masses    Neuro: Alert and oriented times 3, CN 2-12 grossly intact, no nystagmus, PERRL, EOMI, normal speech and gait    Chest/Respiratory Exam: Normal chest wall motion and respiratory effort. No audible stridor or wheezing.    Cardiovascular Exam: Regular rate and rhythm.  No cyanosis, clubbing or edema.    Pulses: carotid pulses normal    ASSESSMENT:  1. Sensorineural hearing loss (SNHL) of both ears    2. Tinnitus of both ears        PLAN: Findings, assessment, and management options were discussed.  Discussed pathophysiology, masking techniques and triggers for tinnitus.  We discussed current guidelines in regards to approach to tinnitus.  Unfortunately there are not many satisfying answers but working on triggers may be helpful. He is medically cleared for hearing aids should he wish to pursue.  Recommend annual audiogram.

## 2021-06-19 NOTE — LETTER
Letter by Misbah Muniz DO at      Author: Misbah Muniz DO Service: -- Author Type: --    Filed:  Encounter Date: 6/18/2019 Status: (Other)         Frank Sharp  [address]  [place] [address]             June 18, 2019         Dear Mr. Sharp,    Below are the results from your recent visit:    Resulted Orders   Occult Blood(ICT)   Result Value Ref Range    Fecal Occult Bld (ICT) 1 Negative Negative    Fecal Occult Blood (ICT) 2 Negative Negative    Fecal Occult Blood (ICT) 3 Negative Negative       The stool cards you submitted were all normal.  If done yearly, this can take place of the colonoscopy.  Colon cancer screening typically stops after age 75.       Please call with questions or contact us using [email or web address].    Sincerely,        Electronically signed by Misbah Muniz DO

## 2021-06-19 NOTE — LETTER
Letter by Misbah Muniz DO at      Author: Misbah Muniz DO Service: -- Author Type: --    Filed:  Encounter Date: 7/3/2019 Status: (Other)         Frank Sharp  [address]  [place] [address]             July 3, 2019         Dear Mr. Sharp,    Below are the results from your recent visit:    Resulted Orders   XR Cervical Spine 2 - 3 VWS    Narrative    EXAM: XR CERVICAL SPINE 2 - 3 VWS  LOCATION: [facility]  DATE/TIME: 7/1/2019 2:40 PM    INDICATION: Cervical radiculopathy-- right arm.  COMPARISON: None.    FINDINGS:   Slight anterolisthesis of C3 and C4. Straightening of usual cervical lordosis. Normal cervical vertebral body heights. No prevertebral soft tissue swelling. Mild to moderate interspace degeneration C6-C7. Mild to moderate facet arthropathy.         Hears a copy of your x-ray showing moderate arthritis of the neck.  We will call about this.  I think physical therapy is the next best step.    Please call with questions or contact us using [email or web address].    Sincerely,        Electronically signed by Misbah Muniz DO

## 2021-06-19 NOTE — PROGRESS NOTES
[facility] Follow Up Note    Frank Sharp   68 y.o. male    Date of Visit: 8/17/2018    Chief Complaint   Patient presents with     Dizziness     not feeling well started yestday, cough     Subjective  This is a 68-year-old patient of Dr. Misbah Muniz.  He comes in because of symptoms of loss of balance.  The patient tells me that he was in his usual state of health until yesterday afternoon when he was walking home when he suddenly felt himself lose his balance and started leaning to the left.  He was able to briefly rest and then get home but noticed that he did not feel well the rest of the evening.  No significant visual changes.  He did complain of a mild headache and fatigue.  He was able to eat and there was no obvious nausea or vomiting.  He reports that he did not sleep well last night and this morning is still not feeling his normal self.  The balance issue seems to have cleared and he has had no difficulty in walking today.  He does mention that he has had slight burning with urination.  No fever or chills.  A mild cough but it is nonproductive.  No chest pain and no significant shortness of breath.  He was not doing anything unusual yesterday and there is been no unusual activity this week.  I did ask him about his fluid intake given the hot humid weather this week and he thinks he has been drinking plenty of fluid.  No other complaints today.    ROS A comprehensive review of systems was performed and was otherwise negative    Medications, allergies, and problem list were reviewed and updated    Exam  General Appearance:   On examination his blood pressure is 122/60.  Weight is 182 pounds and height is 66 inches.  BMI is 29.25.  O2 saturation is 97%.    Heart is in a sinus rhythm with a rate of 80 and no ectopy.    Pupils are equal and there is no nystagmus.    Cranial nerves are intact.    Carotid pulses are full and I hear no bruits.    Good strength in all 4 extremities.  Gait  is normal.    Alternating movements are normal and hand eye coordination is also normal.  Romberg test is negative.    The patient is alert and oriented ×3.      Assessment/Plan  1. Loss of balance  Hemoglobin    Basic Metabolic Panel   2. Dysuria  Urinalysis-UC if Indicated     Mild dysuria.  We will check a urine today.    Loss of balance.  Coupled with his feeling of not being well over the last 24 hours is a little vague.  Neurologically I do not find any specific findings.  I am wondering if he did not experience a little bit of heat exhaustion even though he thought he was getting in significant amounts of fluid.  We will check a BMP and hemoglobin to be sure there is nothing unusual here.  I advised him to make sure he is getting plenty of fluid on the weekend and to rest as much as possible.  I advised him to go to the emergency room should there be any change or worsening of his symptoms.  Otherwise I suggested he follow-up at least by phone with Dr. Mayfield on Monday to update him on his condition.    Total time of this office visit was 25 minutes with greater than 50% of the time spent in care coordination of patient counseling.  Body Mass Index was not assessed due to Patient was in with an acute medical issue..    Adama Kamara MD      Current Outpatient Prescriptions on File Prior to Visit   Medication Sig     atorvastatin (LIPITOR) 20 MG tablet Take 1 tablet (20 mg total) by mouth daily.     fluticasone (FLONASE) 50 mcg/actuation nasal spray 2 sprays into each nostril daily.     multivitamin with minerals (THERA-M) 9 mg iron-400 mcg Tab tablet Take 1 tablet by mouth daily.     tamsulosin (FLOMAX) 0.4 mg Cp24 Take 1 capsule (0.4 mg total) by mouth daily after supper.     No current facility-administered medications on file prior to visit.      No Known Allergies  Social History   Substance Use Topics     Smoking status: Former Smoker     Smokeless tobacco: Former User     Alcohol use Yes       Comment: None since the New Year

## 2021-06-19 NOTE — LETTER
Letter by Misbah Muniz DO at      Author: Misbah Muniz DO Service: -- Author Type: --    Filed:  Encounter Date: 5/17/2019 Status: (Other)         Frank Sharp  [address]  [place] [address]             May 17, 2019         Dear Mr. Sharp,    Below are the results from your recent visit:    Resulted Orders   PSA (Prostatic-Specific Antigen), Annual Screen   Result Value Ref Range    PSA 5.5 (H) 0.0 - 4.5 ng/mL    Narrative    Method is Abbott Prostate-Specific Antigen (PSA)  Standard-WHO 1st International (90:10)   Hepatic Profile   Result Value Ref Range    Bilirubin, Total 0.9 0.0 - 1.0 mg/dL    Bilirubin, Direct 0.3 <=0.5 mg/dL    Protein, Total 7.6 6.0 - 8.0 g/dL    Albumin 4.3 3.5 - 5.0 g/dL    Alkaline Phosphatase 91 45 - 120 U/L    AST 28 0 - 40 U/L    ALT 20 0 - 45 U/L   HM2(CBC w/o Differential)   Result Value Ref Range    WBC 6.1 4.0 - 11.0 thou/uL    RBC 4.00 (L) 4.40 - 6.20 mill/uL    Hemoglobin 13.4 (L) 14.0 - 18.0 g/dL    Hematocrit 39.6 (L) 40.0 - 54.0 %    MCV 99 80 - 100 fL    MCH 33.5 27.0 - 34.0 pg    MCHC 33.8 32.0 - 36.0 g/dL    RDW 11.2 11.0 - 14.5 %    Platelets 255 140 - 440 thou/uL    MPV 6.5 (L) 7.0 - 10.0 fL   Basic Metabolic Panel   Result Value Ref Range    Sodium 134 (L) 136 - 145 mmol/L    Potassium 4.0 3.5 - 5.0 mmol/L    Chloride 99 98 - 107 mmol/L    CO2 24 22 - 31 mmol/L    Anion Gap, Calculation 11 5 - 18 mmol/L    Glucose 97 70 - 125 mg/dL    Calcium 10.2 8.5 - 10.5 mg/dL    BUN 26 (H) 8 - 22 mg/dL    Creatinine 1.12 0.70 - 1.30 mg/dL    GFR MDRD Af Amer >60 >60 mL/min/1.73m2    GFR MDRD Non Af Amer >60 >60 mL/min/1.73m2    Narrative    Fasting Glucose reference range is 70-99 mg/dL per  American Diabetes Association (ADA) guidelines.   LDL Cholesterol, Direct   Result Value Ref Range    Direct LDL 86 <=129 mg/dl       Here is a copy of your lab work.  The tests look good apart from the PSA elevation.  I am referring you to a urologist to  help determine why the PSA is high (and to make sure you don't have prostate cancer).      Please call with questions or contact us using [email or web address].    Sincerely,        Electronically signed by Misbah Muniz, DO

## 2021-06-20 NOTE — PROGRESS NOTES
[facility] Note    Frank Sharp   68 y.o. male    Date of Visit: 8/28/2018  Chief Complaint   Patient presents with     Follow-up     dizziness       ASSESSMENT/PLAN  1. Hyponatremia  Basic Metabolic Panel    Osmolality    Sodium, Random Urine    Osmolality, Random, Urine     ---------------------------------------------    Hyponatremia, isolated episode.  Recheck sodium today.  I suspect this was either lab error or related to his self-limited illness.  I went ahead and ordered serum osmolality, urine osmolality, urine sodium in addition to recheck a BMP just in case it is off.  He is euvolemic on exam, has no symptoms of heart failure.  He has no medications which would precipitate SIADH.  He did have a self-limited headache yesterday on the left side which seems of limited significance.  He has monocular diplopia on the right, this was related to a refractive error from the surgery.    Return in about 3 months (around 11/28/2018) for Recheck.      SUBJECTIVE  Frank Sharp is here for follow-up on hyponatremia.     He was seen by Dr. Kamara on 8/17 for feeling unwell, generally ill, and some balance issues.  Sodium was 128 unexpectedly.  Symptoms spontaneously resolved after that day.  UA was negative, hgb was low at 12.5.      In terms of water intake, he drinks a lot of water sometimes.  Sometimes he will drink 32 oz water.  He sometimes drinks 2-6 beers on a given day, but not every day.      No sign of leg swelling.  He sometimes get shortness of breath after eating.  Sometimes he coughs a lot when he rolls over on his Right (but not left side).      He is able to walk up 3 flights of stairs (to the 3rd floor), but has to stop at each landing to rest, but he cites back pain as the cause.  No major changes in weight (actually down 4.5 lbs since last visit.    Yesterday he had an ache on the left side of his head which lasted all day.  He gets double vision out of the right eye since surgery.   "    He is on tamsulosin      Medications, allergies, and problem list were reviewed and updated    Patient Active Problem List   Diagnosis     Back Pain     Essential Hypercholesterolemia     Visual Impairment In The Right Eye     Chronic sinusitis     Insomnia due to medical condition     Chronic pain of both knees     Benign non-nodular prostatic hyperplasia with lower urinary tract symptoms     Hyponatremia     No past medical history on file.  Current Outpatient Prescriptions   Medication Sig Dispense Refill     atorvastatin (LIPITOR) 20 MG tablet Take 1 tablet (20 mg total) by mouth daily. 90 tablet 3     multivitamin with minerals (THERA-M) 9 mg iron-400 mcg Tab tablet Take 1 tablet by mouth daily.       tamsulosin (FLOMAX) 0.4 mg Cp24 Take 1 capsule (0.4 mg total) by mouth daily after supper. 30 capsule 11     No current facility-administered medications for this visit.      No Known Allergies    EXAM  Vitals:    08/28/18 1540   BP: 118/58   Patient Site: Left Arm   Patient Position: Sitting   Cuff Size: Adult Regular   Pulse: (!) 106   SpO2: 98%   Weight: 177 lb 8 oz (80.5 kg)   Height: 5' 6.14\" (1.68 m)         General: Alert, no distress  ENT: Sclera anicteric, oral mucosa moist, EOMI, pupils equally round  Heart: Regular rhythm, no murmurs  Neck: No JVD  Lungs: Clear to auscultation  Lower extremities: No edema  Skin: No rash [place]   neurologic: Right monocular diplopia    Results reviewed: Reviewed office visit on 8/17 by Dr. Adama Kamara, lab workup which included sodium 128    Data points: 3    Misbah Muniz,   Internal Medicine  [facility]    "

## 2021-06-25 ENCOUNTER — COMMUNICATION - HEALTHEAST (OUTPATIENT)
Dept: GERIATRIC MEDICINE | Facility: CLINIC | Age: 71
End: 2021-06-25

## 2021-06-29 ENCOUNTER — RECORDS - HEALTHEAST (OUTPATIENT)
Dept: ADMINISTRATIVE | Facility: OTHER | Age: 71
End: 2021-06-29

## 2021-07-03 NOTE — ADDENDUM NOTE
Addendum Note by Krishan Muniz DO at 4/7/2017  1:12 PM     Author: Krishan Muniz DO Service: -- Author Type: Physician    Filed: 4/7/2017  1:12 PM Encounter Date: 3/29/2017 Status: Signed    : Krishan Muniz DO (Physician)    Addended by: KRISHAN MUNIZ on: 4/7/2017 01:12 PM        Modules accepted: Orders

## 2021-07-03 NOTE — ADDENDUM NOTE
Addendum Note by Krishan Muniz DO at 5/18/2018  9:24 AM     Author: Krishan Muniz DO Service: -- Author Type: Physician    Filed: 5/18/2018  9:24 AM Encounter Date: 5/14/2018 Status: Signed    : Krishan Muniz DO (Physician)    Addended by: KRISHAN MUNIZ on: 5/18/2018 09:24 AM        Modules accepted: Orders

## 2021-07-03 NOTE — ADDENDUM NOTE
Addendum Note by Krishan Muniz DO at 5/16/2019 12:20 PM     Author: Krishan Muniz DO Service: -- Author Type: Physician    Filed: 5/17/2019  6:53 AM Encounter Date: 5/16/2019 Status: Signed    : Krishan Muniz DO (Physician)    Addended by: KRISHAN MUNIZ on: 5/17/2019 06:53 AM        Modules accepted: Orders

## 2021-07-03 NOTE — ADDENDUM NOTE
Addendum Note by Krishan Muniz DO at 9/21/2017  7:47 AM     Author: Krishan Muniz DO Service: -- Author Type: Physician    Filed: 9/21/2017  7:47 AM Encounter Date: 9/19/2017 Status: Signed    : Krishan Muniz DO (Physician)    Addended by: KRISHAN MUNIZ on: 9/21/2017 07:47 AM        Modules accepted: Orders

## 2021-07-07 NOTE — PROGRESS NOTES
[facility] Care Coordination    Called member to schedule annual HRA home visit. HRA has been scheduled for Tuesday, June 29th at 10 AM.     ELADIA Morrison  [facility]  [phone]

## 2021-07-23 ENCOUNTER — TELEPHONE (OUTPATIENT)
Dept: INTERNAL MEDICINE | Facility: CLINIC | Age: 71
End: 2021-07-23

## 2021-07-23 DIAGNOSIS — Z20.822 EXPOSURE TO COVID-19 VIRUS: Primary | ICD-10-CM

## 2021-07-23 NOTE — TELEPHONE ENCOUNTER
Frank was in the clinic requesting COVID testing due to possible exposure. He was visiting relatives in [place] and returned on 7/10. Some of those family member have tested positive for COVID since he left. He has no symptoms and has been vaccinated.  With [employer]. Please advise and enter orders for testing if necessary.

## 2021-07-26 ENCOUNTER — APPOINTMENT (OUTPATIENT)
Dept: LAB | Facility: CLINIC | Age: 71
End: 2021-07-26
Attending: INTERNAL MEDICINE
Payer: COMMERCIAL

## 2021-07-28 ENCOUNTER — PATIENT OUTREACH (OUTPATIENT)
Dept: GERIATRIC MEDICINE | Facility: CLINIC | Age: 71
End: 2021-07-28

## 2021-07-28 NOTE — LETTER
July 28, 2021      FRANK WARREN  [address]   [place] [address]      Dear Frank:    At [facility], we are dedicated to improving your health and well-being. Enclosed is the Comprehensive Care Plan that we developed with you on 6/29/21. Please review the Care Plan carefully.    As a reminder, some of the things we discussed at your visit include:    Your physical and mental health    Ways to reduce falls    Health care needs you may have    Don t forget to contact your care coordinator if you:    Have been hospitalized or plan to be hospitalized     Have had a fall     Have experienced a change in physical health    Are experiencing emotional problems     If you do not agree with your Care Plan, have questions about it, or have experienced a change in your needs, please call me at  . If you are hearing impaired, please call the [place] Relay at 010 or [phone] (fvxlfy-mf-vflzai relay service).    Sincerely,    ELADIA Morrison  [phone]  [email or web address]     [facility] ([facility]) is a health plan that contracts with both Medicare and the [place] Medical Assistance (Medicaid) program to provide benefits of both programs to enrollees. Enrollment in [facility] depends on contract renewal.    [id number])     [id number] (11/18)

## 2021-07-28 NOTE — PROGRESS NOTES
[facility] Care Coordination Contact    Received after visit chart from care coordinator.  Completed following tasks: Mailed copy of care plan to client     Mailed POC signature page to member to sign and return asap.    Rosio Marinelli  [facility]  Case Management Specialist  [phone]

## 2021-08-12 ENCOUNTER — LAB (OUTPATIENT)
Dept: LAB | Facility: CLINIC | Age: 71
End: 2021-08-12
Payer: COMMERCIAL

## 2021-08-12 DIAGNOSIS — Z20.822 EXPOSURE TO COVID-19 VIRUS: ICD-10-CM

## 2021-08-12 PROCEDURE — U0003 INFECTIOUS AGENT DETECTION BY NUCLEIC ACID (DNA OR RNA); SEVERE ACUTE RESPIRATORY SYNDROME CORONAVIRUS 2 (SARS-COV-2) (CORONAVIRUS DISEASE [COVID-19]), AMPLIFIED PROBE TECHNIQUE, MAKING USE OF HIGH THROUGHPUT TECHNOLOGIES AS DESCRIBED BY CMS-2020-01-R: HCPCS

## 2021-08-12 PROCEDURE — U0005 INFEC AGEN DETEC AMPLI PROBE: HCPCS

## 2021-08-13 ENCOUNTER — TELEPHONE (OUTPATIENT)
Dept: INTERNAL MEDICINE | Facility: CLINIC | Age: 71
End: 2021-08-13

## 2021-08-13 LAB — SARS-COV-2 RNA RESP QL NAA+PROBE: NEGATIVE

## 2021-08-14 NOTE — TELEPHONE ENCOUNTER
Attempt #1: Left a non-detailed message and call back and speak with any triage nurse regarding test results. If patient calls back, please inform him of MD note below and go through education regarding negative result for COVID-19.     Nissa Andino RN, BSN Nurse Triage Advisor 7:47 AM 8/14/2021

## 2021-09-29 ENCOUNTER — OFFICE VISIT (OUTPATIENT)
Dept: INTERNAL MEDICINE | Facility: CLINIC | Age: 71
End: 2021-09-29
Payer: COMMERCIAL

## 2021-09-29 VITALS
DIASTOLIC BLOOD PRESSURE: 62 MMHG | WEIGHT: 185.4 LBS | SYSTOLIC BLOOD PRESSURE: 120 MMHG | BODY MASS INDEX: 29.92 KG/M2 | HEART RATE: 76 BPM | OXYGEN SATURATION: 95 %

## 2021-09-29 DIAGNOSIS — B02.9 HERPES ZOSTER WITHOUT COMPLICATION: Primary | ICD-10-CM

## 2021-09-29 DIAGNOSIS — E55.9 VITAMIN D DEFICIENCY: ICD-10-CM

## 2021-09-29 DIAGNOSIS — Z11.59 ENCOUNTER FOR HEPATITIS C SCREENING TEST FOR LOW RISK PATIENT: ICD-10-CM

## 2021-09-29 DIAGNOSIS — Z12.5 SCREENING FOR PROSTATE CANCER: ICD-10-CM

## 2021-09-29 DIAGNOSIS — Z12.11 COLON CANCER SCREENING: ICD-10-CM

## 2021-09-29 DIAGNOSIS — Z12.5 SPECIAL SCREENING FOR MALIGNANT NEOPLASM OF PROSTATE: ICD-10-CM

## 2021-09-29 DIAGNOSIS — D50.9 IRON DEFICIENCY ANEMIA, UNSPECIFIED IRON DEFICIENCY ANEMIA TYPE: ICD-10-CM

## 2021-09-29 DIAGNOSIS — E78.5 HYPERLIPIDEMIA LDL GOAL <130: ICD-10-CM

## 2021-09-29 LAB
ALBUMIN SERPL-MCNC: 4.1 G/DL (ref 3.5–5)
ALP SERPL-CCNC: 77 U/L (ref 45–120)
ALT SERPL W P-5'-P-CCNC: 22 U/L (ref 0–45)
ANION GAP SERPL CALCULATED.3IONS-SCNC: 10 MMOL/L (ref 5–18)
AST SERPL W P-5'-P-CCNC: 23 U/L (ref 0–40)
BASOPHILS # BLD AUTO: 0 10E3/UL (ref 0–0.2)
BASOPHILS NFR BLD AUTO: 1 %
BILIRUB SERPL-MCNC: 0.8 MG/DL (ref 0–1)
BUN SERPL-MCNC: 18 MG/DL (ref 8–28)
CALCIUM SERPL-MCNC: 9.3 MG/DL (ref 8.5–10.5)
CHLORIDE BLD-SCNC: 99 MMOL/L (ref 98–107)
CHOLEST SERPL-MCNC: 141 MG/DL
CO2 SERPL-SCNC: 24 MMOL/L (ref 22–31)
CREAT SERPL-MCNC: 1 MG/DL (ref 0.7–1.3)
EOSINOPHIL # BLD AUTO: 0.1 10E3/UL (ref 0–0.7)
EOSINOPHIL NFR BLD AUTO: 2 %
ERYTHROCYTE [DISTWIDTH] IN BLOOD BY AUTOMATED COUNT: 12.4 % (ref 10–15)
FASTING STATUS PATIENT QL REPORTED: YES
GFR SERPL CREATININE-BSD FRML MDRD: 76 ML/MIN/1.73M2
GLUCOSE BLD-MCNC: 95 MG/DL (ref 70–125)
HCT VFR BLD AUTO: 38.2 % (ref 40–53)
HDLC SERPL-MCNC: 52 MG/DL
HGB BLD-MCNC: 13 G/DL (ref 13.3–17.7)
IMM GRANULOCYTES # BLD: 0 10E3/UL
IMM GRANULOCYTES NFR BLD: 0 %
LDLC SERPL CALC-MCNC: 71 MG/DL
LYMPHOCYTES # BLD AUTO: 1.5 10E3/UL (ref 0.8–5.3)
LYMPHOCYTES NFR BLD AUTO: 38 %
MCH RBC QN AUTO: 32.7 PG (ref 26.5–33)
MCHC RBC AUTO-ENTMCNC: 34 G/DL (ref 31.5–36.5)
MCV RBC AUTO: 96 FL (ref 78–100)
MONOCYTES # BLD AUTO: 0.6 10E3/UL (ref 0–1.3)
MONOCYTES NFR BLD AUTO: 14 %
NEUTROPHILS # BLD AUTO: 1.7 10E3/UL (ref 1.6–8.3)
NEUTROPHILS NFR BLD AUTO: 45 %
PLATELET # BLD AUTO: 184 10E3/UL (ref 150–450)
POTASSIUM BLD-SCNC: 4.1 MMOL/L (ref 3.5–5)
PROT SERPL-MCNC: 7 G/DL (ref 6–8)
PSA SERPL-MCNC: 0.95 UG/L (ref 0–6.5)
RBC # BLD AUTO: 3.97 10E6/UL (ref 4.4–5.9)
SODIUM SERPL-SCNC: 133 MMOL/L (ref 136–145)
TRIGL SERPL-MCNC: 90 MG/DL
TSH SERPL DL<=0.005 MIU/L-ACNC: 3.92 UIU/ML (ref 0.3–5)
VIT B12 SERPL-MCNC: 612 PG/ML (ref 213–816)
WBC # BLD AUTO: 3.9 10E3/UL (ref 4–11)

## 2021-09-29 PROCEDURE — 36415 COLL VENOUS BLD VENIPUNCTURE: CPT | Performed by: INTERNAL MEDICINE

## 2021-09-29 PROCEDURE — G0008 ADMIN INFLUENZA VIRUS VAC: HCPCS | Performed by: INTERNAL MEDICINE

## 2021-09-29 PROCEDURE — G0103 PSA SCREENING: HCPCS | Performed by: INTERNAL MEDICINE

## 2021-09-29 PROCEDURE — 84443 ASSAY THYROID STIM HORMONE: CPT | Performed by: INTERNAL MEDICINE

## 2021-09-29 PROCEDURE — 83550 IRON BINDING TEST: CPT | Performed by: INTERNAL MEDICINE

## 2021-09-29 PROCEDURE — 80061 LIPID PANEL: CPT | Performed by: INTERNAL MEDICINE

## 2021-09-29 PROCEDURE — 90662 IIV NO PRSV INCREASED AG IM: CPT | Performed by: INTERNAL MEDICINE

## 2021-09-29 PROCEDURE — 82306 VITAMIN D 25 HYDROXY: CPT | Performed by: INTERNAL MEDICINE

## 2021-09-29 PROCEDURE — 99214 OFFICE O/P EST MOD 30 MIN: CPT | Mod: 25 | Performed by: INTERNAL MEDICINE

## 2021-09-29 PROCEDURE — 80053 COMPREHEN METABOLIC PANEL: CPT | Performed by: INTERNAL MEDICINE

## 2021-09-29 PROCEDURE — 85025 COMPLETE CBC W/AUTO DIFF WBC: CPT | Performed by: INTERNAL MEDICINE

## 2021-09-29 PROCEDURE — 82607 VITAMIN B-12: CPT | Performed by: INTERNAL MEDICINE

## 2021-09-29 PROCEDURE — 86803 HEPATITIS C AB TEST: CPT | Performed by: INTERNAL MEDICINE

## 2021-09-29 RX ORDER — VALACYCLOVIR HYDROCHLORIDE 1 G/1
1000 TABLET, FILM COATED ORAL 3 TIMES DAILY
Qty: 30 TABLET | Refills: 0 | Status: SHIPPED | OUTPATIENT
Start: 2021-09-29 | End: 2022-09-12

## 2021-09-29 NOTE — PATIENT INSTRUCTIONS
1. Pain is due to shingles rash.    Start on Valtrex antiviral medication to shorten duration.  After this rash heals, you can get Shingles vaccine at the pharmacy ( 2 shots 2-6 months apart)  For pain: Can Take Ibuprofen as need. If pain gets worse, let me know and I can give you pain medication for nerve pain.     2. Will check blood work today, if still anemic I will order colonoscopy. If red cell counts are normal, then I will order the Cologuard (box test)

## 2021-09-29 NOTE — PROGRESS NOTES
[facility] Follow Up Note    Assessment/Plan:    1. Herpes zoster without complication  Left C5 nerve root distribution of the rash.  Pain is mild, he can take Tylenol ibuprofen as needed.  We will start him on Valtrex.  Discussed to get shingles vaccine once rash has resolved.  - valACYclovir (VALTREX) 1000 mg tablet; Take 1 tablet (1,000 mg) by mouth 3 times daily for 10 days  Dispense: 30 tablet; Refill: 0    2. Hyperlipidemia LDL goal <130  He is on Lipitor.  We will check annual blood work  - TSH  - Comprehensive metabolic panel  - Lipid panel reflex to direct LDL Fasting    3. Iron deficiency anemia, unspecified iron deficiency anemia type  Previous blood work in 2019 showed mild anemia, he never repeated blood work last year, will recheck it today.  - Vitamin B12  - Iron and iron binding capacity  - CBC with platelets and differential    4.  Health maintenance:- Hepatitis C antibody  Will start with blood work, PSA screen, recommended shingles vaccine, will see what blood work looks like, if anemia has resolved he wants to do Cologuard but he if he is still anemic discussed that he will need to do colonoscopy.  Aleyda to come back in 6 months, he will need pneumonia shot.   - PSA, screen  - Vitamin D Deficiency      Patient Instructions   1. Pain is due to shingles rash.    Start on Valtrex antiviral medication to shorten duration.  After this rash heals, you can get Shingles vaccine at the pharmacy ( 2 shots 2-6 months apart)  For pain: Can Take Ibuprofen as need. If pain gets worse, let me know and I can give you pain medication for nerve pain.     2. Will check blood work today, if still anemic I will order colonoscopy. If red cell counts are normal, then I will order the Cologuard (box test)      Dona Sosa MD, MD    Chief Complaint:    Chief Complaint   Patient presents with     Back Pain     Lump on upper left back       History of Present Illness:  Frank is a 71 year old male with  h/o hyperlipidemia, low back pain, BPH, history of transiently elevated PSA and moderate alcohol intake.  He is currently here for acute visit due to a painful bump on his left shoulder.    Symptoms started 6 days ago.  He felt painful skin lesion on his left shoulder.  She also had some neck pain and had difficulty turning his head.  Neck pain has improved but currently he also has vesicular lesion on his left bicep, it burns slightly but he denies severe pain.  On exam and appears to have shingles outbreak at C5 distribution.  He has never had shingles before, he denies being recently sick or ill or vaccinated.    I have not seen him since 2020.  At that time I ordered blood work and he has not completed it.  I asked him to have that done today.  He does drink moderate amount of alcohol and usually once a week drinks a sixpack of beer with a friend.  He has never had colonoscopy done, last year blood work showed mild anemia.  Currently he denies any abdominal pain or black stools, discussed that if he is still anemic he will need to have colonoscopy done instead of Cologuard.  He did get [employer] Covid vaccination earlier this year.    Review of Systems:  A comprehensive review of systems was performed and was otherwise negative, he denies any chest pains palpitations or shortness of breath, no urinary symptoms, no blood in the stool.    PFSH:  Social History: Reviewed  History   Smoking Status     Former Smoker   Smokeless Tobacco     Former User     Social History     Social History Narrative    Former manual  on disability.         Past History: Reviewed  Current Outpatient Medications   Medication Sig Dispense Refill     atorvastatin (LIPITOR) 20 MG tablet [ATORVASTATIN (LIPITOR) 20 MG TABLET] TAKE 1 TABLET DAILY (DUE FOR OFFICE VISIT, CONTACT CLINIC FOR APPOINTMENT) 90 tablet 2     tamsulosin (FLOMAX) 0.4 mg cap [TAMSULOSIN (FLOMAX) 0.4 MG CAP] TAKE 1 CAPSULE DAILY AFTER SUPPER (DUE FOR OFFICE  VISIT, CONTACT CLINIC FOR APPOINTMENT) 90 capsule 2     valACYclovir (VALTREX) 1000 mg tablet Take 1 tablet (1,000 mg) by mouth 3 times daily for 10 days 30 tablet 0       Family History: Viewed    Physical Exam:    Vitals:    09/29/21 1244   BP: 120/62   BP Location: Left arm   Patient Position: Sitting   Cuff Size: Adult Large   Pulse: 76   SpO2: 95%   Weight: 84.1 kg (185 lb 6.4 oz)     Wt Readings from Last 3 Encounters:   09/29/21 84.1 kg (185 lb 6.4 oz)   09/17/20 82.9 kg (182 lb 12 oz)   09/24/19 80.5 kg (177 lb 6 oz)     Body mass index is 29.92 kg/m .    Constitutional:  Reveals a pleasant male.  Vitals:  Per nursing notes.  HEENT:No cervical LAD, no thyromegaly,  conjunctiva is pink, no scleral icterus, TMs are visualized and normal bl, oropharynx is clear, no exudates,   Cardiac:  Regular rate and rhythm,no murmurs, rubs, or gallops.  Legs without edema. Palpation of the radial pulse regular.  Lungs: Clear to auscultation bl.  Respiratory effort normal.  Abdomen:positive BS, soft, nontender, nondistended.  No hepato-splenomagaly  Skin:   Vesicular lesions in clusters in the distribution of left C5 nerve root.  Rheumatologic: Normal joints and nails of the hands.  Neurologic:  Cranial nerves II-XII intact.     Psychiatric: affect appropriate, memory intact.     Data Review:    Analysis and Summary of Old Records (2): yes      Records Requested (1): no      Other History Summarized (from other people in the room) (2): no    Radiology Tests Summarized (XRAY/CT/MRI/DXA) (1): no    Labs Reviewed (1): yes    Medicine Tests Reviewed (EKG/ECHO/COLONOSCOPY/EGD) (1): no    Independent Review of EKG or X-RAY (2): no

## 2021-09-29 NOTE — LETTER
September 30, 2021      Frankcarrie Sharp  [address]   [place] [address]        Dear ,    We are writing to inform you of your test results.    Your still have mild anemia ( low red cell count). I recommend that you have screening colonoscopy procedure this year (I put order in).    Iron and B12 levels are normal.  Prostate test is normal.  Kidney, liver, thyroid functions and sugar are normal.  Cholesterol level is excellent.  Vitamin D level is good.  Hepatitis C screen is negative.    Resulted Orders   TSH   Result Value Ref Range    TSH 3.92 0.30 - 5.00 uIU/mL   Vitamin B12   Result Value Ref Range    Vitamin B12 612 213 - 816 pg/mL   Iron and iron binding capacity   Result Value Ref Range    Iron 99 35 - 180 ug/dL    Iron Binding Capacity 298 240 - 430 ug/dL    Iron Sat Index 33 15 - 46 %   PSA, screen   Result Value Ref Range    Prostate Specific Antigen Screen 0.95 0.00 - 6.50 ug/L   Comprehensive metabolic panel   Result Value Ref Range    Sodium 133 (L) 136 - 145 mmol/L    Potassium 4.1 3.5 - 5.0 mmol/L    Chloride 99 98 - 107 mmol/L    Carbon Dioxide (CO2) 24 22 - 31 mmol/L    Anion Gap 10 5 - 18 mmol/L    Urea Nitrogen 18 8 - 28 mg/dL    Creatinine 1.00 0.70 - 1.30 mg/dL    Calcium 9.3 8.5 - 10.5 mg/dL    Glucose 95 70 - 125 mg/dL    Alkaline Phosphatase 77 45 - 120 U/L    AST 23 0 - 40 U/L    ALT 22 0 - 45 U/L    Protein Total 7.0 6.0 - 8.0 g/dL    Albumin 4.1 3.5 - 5.0 g/dL    Bilirubin Total 0.8 0.0 - 1.0 mg/dL    GFR Estimate 76 >60 mL/min/1.73m2      Comment:      As of July 11, 2021, eGFR is calculated by the CKD-EPI creatinine equation, without race adjustment. eGFR can be influenced by muscle mass, exercise, and diet. The reported eGFR is an estimation only and is only applicable if the renal function is stable.   Lipid panel reflex to direct LDL Fasting   Result Value Ref Range    Cholesterol 141 <=199 mg/dL    Triglycerides 90 <=149 mg/dL    Direct Measure HDL 52 >=40  mg/dL      Comment:      HDL Cholesterol Reference Range:     0-2 years:   No reference ranges established for patients under 2 years old  at [facility] for lipid analytes.    2-8 years:  Greater than 45 mg/dL     18 years and older:   Female: Greater than or equal to 50 mg/dL   Male:   Greater than or equal to 40 mg/dL    LDL Cholesterol Calculated 71 <=129 mg/dL    Patient Fasting > 8hrs? Yes    Hepatitis C antibody   Result Value Ref Range    Hepatitis C Antibody Negative Negative    Narrative    Assay performance characteristics have not been established for newborns, infants, children (<18 years) or populations of immunocompromised or immunosuppressed patients.    Vitamin D Deficiency   Result Value Ref Range    Vitamin D, Total (25-Hydroxy) 44 30 - 80 ug/L    Narrative    Deficiency <10.0 ug/L  Insufficiency 10.0-29.9 ug/L  Sufficiency 30.0-80.0 ug/L  Toxicity (possible) >100.0 ug/L    CBC with platelets and differential   Result Value Ref Range    WBC Count 3.9 (L) 4.0 - 11.0 10e3/uL    RBC Count 3.97 (L) 4.40 - 5.90 10e6/uL    Hemoglobin 13.0 (L) 13.3 - 17.7 g/dL    Hematocrit 38.2 (L) 40.0 - 53.0 %    MCV 96 78 - 100 fL    MCH 32.7 26.5 - 33.0 pg    MCHC 34.0 31.5 - 36.5 g/dL    RDW 12.4 10.0 - 15.0 %    Platelet Count 184 150 - 450 10e3/uL    % Neutrophils 45 %    % Lymphocytes 38 %    % Monocytes 14 %    % Eosinophils 2 %    % Basophils 1 %    % Immature Granulocytes 0 %    Absolute Neutrophils 1.7 1.6 - 8.3 10e3/uL    Absolute Lymphocytes 1.5 0.8 - 5.3 10e3/uL    Absolute Monocytes 0.6 0.0 - 1.3 10e3/uL    Absolute Eosinophils 0.1 0.0 - 0.7 10e3/uL    Absolute Basophils 0.0 0.0 - 0.2 10e3/uL    Absolute Immature Granulocytes 0.0 <=0.0 10e3/uL       If you have any questions or concerns, please call the clinic at the number listed above.       Sincerely,      Dona Sosa MD

## 2021-09-30 LAB
DEPRECATED CALCIDIOL+CALCIFEROL SERPL-MC: 44 UG/L (ref 30–80)
HCV AB SERPL QL IA: NEGATIVE
IRON SATN MFR SERPL: 33 % (ref 15–46)
IRON SERPL-MCNC: 99 UG/DL (ref 35–180)
TIBC SERPL-MCNC: 298 UG/DL (ref 240–430)

## 2022-01-18 VITALS
BODY MASS INDEX: 29.37 KG/M2 | DIASTOLIC BLOOD PRESSURE: 62 MMHG | RESPIRATION RATE: 16 BRPM | OXYGEN SATURATION: 97 % | HEIGHT: 66 IN | HEART RATE: 66 BPM | SYSTOLIC BLOOD PRESSURE: 110 MMHG | WEIGHT: 182.75 LBS

## 2022-01-19 ENCOUNTER — PATIENT OUTREACH (OUTPATIENT)
Dept: GERIATRIC MEDICINE | Facility: CLINIC | Age: 72
End: 2022-01-19
Payer: COMMERCIAL

## 2022-01-19 NOTE — PROGRESS NOTES
Called member to complete six month assessment and left a message requesting a return call.    ELADIA Morrison  [facility]  [phone]

## 2022-01-28 NOTE — PROGRESS NOTES
1/28/22      [facility] Six-Month Telephone Assessment    6 month telephone assessment completed on 1/28/22.    ER visits: No  Hospitalizations: No  TCU stays: No  Significant health status changes: None reported.  Falls/Injuries: No  ADL/IADL changes: No  Changes in services: No    Caregiver Assessment follow up:  N/A    Goals: See POC in chart for goal progress documentation.      Member reported that he has been doing well and staying home. He has not spoken with his PCP regarding a prescription for a raised toilet seat and shower handle bar. CC will send prescription request to PCP after confirming with [facility] to see if those items are paid through [place] or [place].    Will see member in 6 months for an annual health risk assessment.   Encouraged member to call CC with any questions or concerns in the meantime.       ELADIA Morrison  [facility]  [phone]

## 2022-02-11 ENCOUNTER — TELEPHONE (OUTPATIENT)
Dept: GERIATRIC MEDICINE | Facility: CLINIC | Age: 72
End: 2022-02-11
Payer: COMMERCIAL

## 2022-02-11 DIAGNOSIS — M54.50 CHRONIC MIDLINE LOW BACK PAIN, UNSPECIFIED WHETHER SCIATICA PRESENT: Primary | ICD-10-CM

## 2022-02-11 DIAGNOSIS — G89.29 CHRONIC MIDLINE LOW BACK PAIN, UNSPECIFIED WHETHER SCIATICA PRESENT: Primary | ICD-10-CM

## 2022-02-11 NOTE — TELEPHONE ENCOUNTER
Sonny Barba,  I signed the order, however if insurance needs supporting documentation for the order,  freda Angulo will need to follow-up with me  since we did not discuss it on his last visit in September.

## 2022-02-11 NOTE — TELEPHONE ENCOUNTER
Hi Dr. Sosa,    My name is Freda Sabillon and I am the [facility] Care Coordinator for this patient. He reported that he would benefit from a toilet seat riser due to back and knee pain. I have submitted this order request for your approval and signature. This item is covered by his insurance. If you agree and approve this order, please complete, sign, and route back to me. I will complete the order process through [facility].     Let me know if you have any questions.     Thank you!

## 2022-02-15 ENCOUNTER — PATIENT OUTREACH (OUTPATIENT)
Dept: GERIATRIC MEDICINE | Facility: CLINIC | Age: 72
End: 2022-02-15
Payer: COMMERCIAL

## 2022-02-18 ENCOUNTER — TELEPHONE (OUTPATIENT)
Dept: INTERNAL MEDICINE | Facility: CLINIC | Age: 72
End: 2022-02-18
Payer: COMMERCIAL

## 2022-02-18 NOTE — PROGRESS NOTES
2/18/22    Received a message from member's PCP stating that a note of necessity is needed to order toilet seat riser and for member to schedule an office visit.     Left a voicemail for member to return call.     Freda Sabillon DOLLY  [facility]  [phone]

## 2022-02-18 NOTE — PROGRESS NOTES
Received an email from Pema at [facility] stating that they need member's current weight and height along with information regarding his toilet, if it's elongated or standard.     Called and left voicemail for member to return call.     Spoke with Pema and informed her that once I get a hold of member and get the information, I will pass it along to Pema.     Freda Sabillon [facility]  [phone]

## 2022-02-18 NOTE — TELEPHONE ENCOUNTER
Sonny Barba,  DME company requesting a note of necessity to go with toiled seat riser for insurance to cover.  I last saw Frank in September and this was not discussed.  Please ask him to schedule an office visit.    Thank you,  Dr CORREA

## 2022-05-25 ENCOUNTER — PATIENT OUTREACH (OUTPATIENT)
Dept: GERIATRIC MEDICINE | Facility: CLINIC | Age: 72
End: 2022-05-25
Payer: COMMERCIAL

## 2022-05-25 NOTE — LETTER
June 9, 2022      AAKASH WARREN  [address]   [place] [address]        Dear Aakash:     Your Care Coordinator has been unable to reach you by telephone. I am writing to ask you or an authorized representative to call me at [phone]. If you reach my voicemail, please leave a message with your daytime telephone number and a date and time that I can call you. If you are hearing impaired, please call the [place]ay at 286 or [phone] (goxgcp-lw-bsysin relay service).     The reason I am trying to reach you is:    [] Six (6) month check-in  [x] To schedule your annual assessment  [] Other:      Please call me as soon as you receive this letter. I look forward to speaking with you.    Sincerely,    LEADIA Morrison  [phone]  [email or web address] ([facility]) is a health plan that contracts with both Medicare and the [place] (Medicaid) program to provide benefits of both programs to enrollees. Enrollment in [facility] depends on contract renewal.    [facility]+[id number] DHS APPROVED ([id number])  [id number] (02/19)

## 2022-05-27 NOTE — PROGRESS NOTES
5/27/22    Called member to schedule annual HRA home visit. Left a message requesting a return call to schedule HRA.     ELADIA Morrison  [facility]  [phone]

## 2022-05-27 NOTE — PROGRESS NOTES
Called member to schedule annual HRA home visit. Left a message requesting a return call to schedule HRA.     Freda Sabillon [facility]  [phone]

## 2022-06-02 NOTE — PROGRESS NOTES
6/2/22    Called member to schedule annual HRA home visit. Left a message requesting a return call to schedule HRA.     ELADIA Morrison  [facility]  [phone]

## 2022-06-09 NOTE — PROGRESS NOTES
"[facility] Care Coordination Contact    Per CC, mailed client an \"Unable to Contact\" letter.     Xiomara Trinidad  Care Management Specialist  [facility]  [phone]    "

## 2022-06-10 ENCOUNTER — PATIENT OUTREACH (OUTPATIENT)
Dept: GERIATRIC MEDICINE | Facility: CLINIC | Age: 72
End: 2022-06-10
Payer: COMMERCIAL

## 2022-06-10 NOTE — PROGRESS NOTES
Completed 4 attempts to reach client with no response.  Member is officially unable to contact effective today.  Completed MMIS entry.  Completed health plan required [facility] POC.    Follow-up Plan: CC will attempt to reach member in six months.    This CC note routed to PCP.    ELADIA Morrison  [facility]  [phone]

## 2022-06-23 ENCOUNTER — NURSE TRIAGE (OUTPATIENT)
Dept: NURSING | Facility: CLINIC | Age: 72
End: 2022-06-23

## 2022-06-23 NOTE — TELEPHONE ENCOUNTER
Patient calling with medication question - needs a refill of flomax, but his pharmacy is closed.     Will  refill at [facility] in [facility] [address] in [place]    Mera Joiner RN  06/23/22 11:29 AM  [facility] Nurse Advisor    Additional Information    Caller has medication question only, adult not sick, and triager answers question    Protocols used: MEDICATION QUESTION CALL-A-[place]

## 2022-07-13 ENCOUNTER — OFFICE VISIT (OUTPATIENT)
Dept: INTERNAL MEDICINE | Facility: CLINIC | Age: 72
End: 2022-07-13
Payer: COMMERCIAL

## 2022-07-13 VITALS
WEIGHT: 179.6 LBS | RESPIRATION RATE: 17 BRPM | SYSTOLIC BLOOD PRESSURE: 118 MMHG | BODY MASS INDEX: 28.87 KG/M2 | DIASTOLIC BLOOD PRESSURE: 74 MMHG | OXYGEN SATURATION: 97 % | HEIGHT: 66 IN | HEART RATE: 78 BPM

## 2022-07-13 DIAGNOSIS — G89.29 CHRONIC RIGHT SHOULDER PAIN: ICD-10-CM

## 2022-07-13 DIAGNOSIS — M79.672 FOOT PAIN, BILATERAL: ICD-10-CM

## 2022-07-13 DIAGNOSIS — R09.81 NASAL CONGESTION: ICD-10-CM

## 2022-07-13 DIAGNOSIS — D64.9 ANEMIA, UNSPECIFIED TYPE: ICD-10-CM

## 2022-07-13 DIAGNOSIS — Z12.11 SCREEN FOR COLON CANCER: ICD-10-CM

## 2022-07-13 DIAGNOSIS — E78.5 HYPERLIPIDEMIA LDL GOAL <130: ICD-10-CM

## 2022-07-13 DIAGNOSIS — F32.A DEPRESSION, UNSPECIFIED DEPRESSION TYPE: ICD-10-CM

## 2022-07-13 DIAGNOSIS — G47.9 SLEEP DISORDER: ICD-10-CM

## 2022-07-13 DIAGNOSIS — Z23 NEED FOR VACCINATION: ICD-10-CM

## 2022-07-13 DIAGNOSIS — Z12.5 SCREENING FOR PROSTATE CANCER: ICD-10-CM

## 2022-07-13 DIAGNOSIS — M25.511 CHRONIC RIGHT SHOULDER PAIN: ICD-10-CM

## 2022-07-13 DIAGNOSIS — Z00.00 ENCOUNTER FOR WELLNESS EXAMINATION IN ADULT: Primary | ICD-10-CM

## 2022-07-13 DIAGNOSIS — M62.838 TRAPEZIUS MUSCLE SPASM: ICD-10-CM

## 2022-07-13 DIAGNOSIS — M79.671 FOOT PAIN, BILATERAL: ICD-10-CM

## 2022-07-13 PROBLEM — M19.90 OSTEOARTHROSIS: Status: ACTIVE | Noted: 2022-07-13

## 2022-07-13 PROBLEM — E78.00 PRIMARY HYPERCHOLESTEROLEMIA: Status: ACTIVE | Noted: 2022-07-13

## 2022-07-13 PROBLEM — M77.10 TENNIS ELBOW: Status: ACTIVE | Noted: 2022-07-13

## 2022-07-13 PROBLEM — F32.9 REACTIVE DEPRESSION (SITUATIONAL): Status: ACTIVE | Noted: 2022-07-13

## 2022-07-13 PROBLEM — M54.50 CHRONIC LOW BACK PAIN: Status: ACTIVE | Noted: 2022-07-13

## 2022-07-13 LAB
ALBUMIN SERPL BCG-MCNC: 4.7 G/DL (ref 3.5–5.2)
ALP SERPL-CCNC: 78 U/L (ref 40–129)
ALT SERPL W P-5'-P-CCNC: 15 U/L (ref 10–50)
ANION GAP SERPL CALCULATED.3IONS-SCNC: 13 MMOL/L (ref 7–15)
AST SERPL W P-5'-P-CCNC: 22 U/L (ref 10–50)
BASOPHILS # BLD AUTO: 0 10E3/UL (ref 0–0.2)
BASOPHILS NFR BLD AUTO: 1 %
BILIRUB SERPL-MCNC: 0.8 MG/DL
BUN SERPL-MCNC: 11.4 MG/DL (ref 8–23)
CALCIUM SERPL-MCNC: 9.3 MG/DL (ref 8.8–10.2)
CHLORIDE SERPL-SCNC: 89 MMOL/L (ref 98–107)
CHOLEST SERPL-MCNC: 156 MG/DL
CREAT SERPL-MCNC: 0.85 MG/DL (ref 0.67–1.17)
DEPRECATED HCO3 PLAS-SCNC: 24 MMOL/L (ref 22–29)
EOSINOPHIL # BLD AUTO: 0.1 10E3/UL (ref 0–0.7)
EOSINOPHIL NFR BLD AUTO: 3 %
ERYTHROCYTE [DISTWIDTH] IN BLOOD BY AUTOMATED COUNT: 12.9 % (ref 10–15)
GFR SERPL CREATININE-BSD FRML MDRD: >90 ML/MIN/1.73M2
GLUCOSE SERPL-MCNC: 99 MG/DL (ref 70–99)
HCT VFR BLD AUTO: 35.9 % (ref 40–53)
HDLC SERPL-MCNC: 69 MG/DL
HGB BLD-MCNC: 12.5 G/DL (ref 13.3–17.7)
IMM GRANULOCYTES # BLD: 0 10E3/UL
IMM GRANULOCYTES NFR BLD: 0 %
IRON BINDING CAPACITY (ROCHE): 310 UG/DL (ref 240–430)
IRON SATN MFR SERPL: 57 % (ref 15–46)
IRON SERPL-MCNC: 178 UG/DL (ref 61–157)
LDLC SERPL CALC-MCNC: 71 MG/DL
LYMPHOCYTES # BLD AUTO: 1.8 10E3/UL (ref 0.8–5.3)
LYMPHOCYTES NFR BLD AUTO: 39 %
MCH RBC QN AUTO: 32.5 PG (ref 26.5–33)
MCHC RBC AUTO-ENTMCNC: 34.8 G/DL (ref 31.5–36.5)
MCV RBC AUTO: 93 FL (ref 78–100)
MONOCYTES # BLD AUTO: 0.6 10E3/UL (ref 0–1.3)
MONOCYTES NFR BLD AUTO: 12 %
NEUTROPHILS # BLD AUTO: 2.1 10E3/UL (ref 1.6–8.3)
NEUTROPHILS NFR BLD AUTO: 46 %
NONHDLC SERPL-MCNC: 87 MG/DL
PLATELET # BLD AUTO: 187 10E3/UL (ref 150–450)
POTASSIUM SERPL-SCNC: 4.3 MMOL/L (ref 3.4–5.3)
PROT SERPL-MCNC: 7 G/DL (ref 6.4–8.3)
PSA SERPL-MCNC: 1.41 NG/ML (ref 0–6.5)
RBC # BLD AUTO: 3.85 10E6/UL (ref 4.4–5.9)
RETICS # AUTO: 0.08 10E6/UL (ref 0.01–0.11)
RETICS/RBC NFR AUTO: 2.1 % (ref 0.8–2.7)
SODIUM SERPL-SCNC: 126 MMOL/L (ref 136–145)
TRIGL SERPL-MCNC: 80 MG/DL
VIT B12 SERPL-MCNC: 642 PG/ML (ref 232–1245)
WBC # BLD AUTO: 4.6 10E3/UL (ref 4–11)

## 2022-07-13 PROCEDURE — G0009 ADMIN PNEUMOCOCCAL VACCINE: HCPCS | Performed by: INTERNAL MEDICINE

## 2022-07-13 PROCEDURE — 80061 LIPID PANEL: CPT | Performed by: INTERNAL MEDICINE

## 2022-07-13 PROCEDURE — 99214 OFFICE O/P EST MOD 30 MIN: CPT | Mod: 25 | Performed by: INTERNAL MEDICINE

## 2022-07-13 PROCEDURE — 85045 AUTOMATED RETICULOCYTE COUNT: CPT | Performed by: INTERNAL MEDICINE

## 2022-07-13 PROCEDURE — 36415 COLL VENOUS BLD VENIPUNCTURE: CPT | Performed by: INTERNAL MEDICINE

## 2022-07-13 PROCEDURE — 83550 IRON BINDING TEST: CPT | Performed by: INTERNAL MEDICINE

## 2022-07-13 PROCEDURE — 83540 ASSAY OF IRON: CPT | Performed by: INTERNAL MEDICINE

## 2022-07-13 PROCEDURE — 85025 COMPLETE CBC W/AUTO DIFF WBC: CPT | Performed by: INTERNAL MEDICINE

## 2022-07-13 PROCEDURE — G0103 PSA SCREENING: HCPCS | Performed by: INTERNAL MEDICINE

## 2022-07-13 PROCEDURE — G0439 PPPS, SUBSEQ VISIT: HCPCS | Performed by: INTERNAL MEDICINE

## 2022-07-13 PROCEDURE — 82607 VITAMIN B-12: CPT | Performed by: INTERNAL MEDICINE

## 2022-07-13 PROCEDURE — 80053 COMPREHEN METABOLIC PANEL: CPT | Performed by: INTERNAL MEDICINE

## 2022-07-13 PROCEDURE — 90732 PPSV23 VACC 2 YRS+ SUBQ/IM: CPT | Performed by: INTERNAL MEDICINE

## 2022-07-13 RX ORDER — GABAPENTIN 100 MG/1
100 CAPSULE ORAL 3 TIMES DAILY
Qty: 30 CAPSULE | Refills: 1 | Status: SHIPPED | OUTPATIENT
Start: 2022-07-13 | End: 2022-09-12

## 2022-07-13 RX ORDER — ESCITALOPRAM OXALATE 5 MG/1
5 TABLET ORAL DAILY
Qty: 30 TABLET | Refills: 3 | Status: SHIPPED | OUTPATIENT
Start: 2022-07-13 | End: 2022-09-12

## 2022-07-13 ASSESSMENT — ENCOUNTER SYMPTOMS
DIARRHEA: 0
ARTHRALGIAS: 0
DYSURIA: 0
HEADACHES: 0
PARESTHESIAS: 0
ABDOMINAL PAIN: 0
SORE THROAT: 0
CHILLS: 0
NERVOUS/ANXIOUS: 0
HEMATURIA: 0
DIZZINESS: 0
HEARTBURN: 0
MYALGIAS: 1
FEVER: 0
COUGH: 0
CONSTIPATION: 0
PALPITATIONS: 0
WEAKNESS: 0
HEMATOCHEZIA: 0
EYE PAIN: 0
NAUSEA: 0
SHORTNESS OF BREATH: 0
JOINT SWELLING: 0
FREQUENCY: 0

## 2022-07-13 ASSESSMENT — PAIN SCALES - GENERAL: PAINLEVEL: NO PAIN (0)

## 2022-07-13 ASSESSMENT — ACTIVITIES OF DAILY LIVING (ADL): CURRENT_FUNCTION: NO ASSISTANCE NEEDED

## 2022-07-13 NOTE — PATIENT INSTRUCTIONS
Schedule fasting lab appointment    2. Have cologuard test done for colon cancer screening.    3. For right arm pain: could be due to an irritated nerve and sprain of thr muscle in the back.  Try PT for stretching.  Can try gabapentin medication as needed for nerve pain.    4. Foot pain: see podiatrist    5. See sleep clinic to have sleep apnea testing done due to breaks in breathing at night     6. Depression: talk to our care coordination team about help with moving.   Also a small dose antidepressant: Lexapro.

## 2022-07-13 NOTE — PROGRESS NOTES
SUBJECTIVE:   Frank Sharp is a 72 year old male who presents for Preventive Visit.    Frank is a 72 year old male with h/o hyperlipidemia, low back pain, BPH, history of transiently elevated PSA and moderate alcohol intake, history of shingles.  He is currently here for a wellness exam.    He complains about right arm pain.  It comes and goes but is worse at night if he sleeps on that side.  Sometimes he feels numbness and burning sensation in his right trapezius muscle and pain radiating down his arm into his wrist.  He denies any numbness, tingling or weakness in his .  This pain started in November and has not improved.  He attributed it to vaccinations that were given to him at the pharmacy at the time of his shingles vaccine.  Currently he denies any shoulder joint pain or decreased range of motion.  No neck pain.  He has not tried anything over-the-counter.    He also have been having cramping in his feet especially after he walks.  He denies any morning pain in his feet 21st steps out of bed.  No cramping or swelling in his hands.    We also got to talking about depression.  He lives in subsidized housing and currently reports that has been a lot of drug use going on.  It stresses him out that sometimes he feels hopeless and would like to move out of this building but has not found suitable place to relocate.  He also has problems with transportation sometimes.  His depression symptoms including more of a social isolation in his apartment and sometimes feeling angry.    Patient has been advised of split billing requirements and indicates understanding: Yes  Are you in the first 12 months of your Medicare coverage?  No    Musculoskeletal Problem  Associated symptoms include myalgias. Pertinent negatives include no abdominal pain, arthralgias, chest pain, chills, congestion, coughing, fever, headaches, joint swelling, nausea, rash, sore throat or weakness.   Healthy Habits:     In general, how would you  "rate your overall health?  Good    Duration of exercise:  15-30 minutes    Do you usually eat at least 4 servings of fruit and vegetables a day, include whole grains    & fiber and avoid regularly eating high fat or \"junk\" foods?  Yes    Taking medications regularly:  Yes    Medication side effects:  Muscle aches    Ability to successfully perform activities of daily living:  No assistance needed    Home Safety:  No safety concerns identified    Hearing Impairment:  No hearing concerns    In the past 6 months, have you been bothered by leaking of urine?  No    In general, how would you rate your overall mental or emotional health?  Good      PHQ-2 Total Score: 0    Additional concerns today:  No    Do you feel safe in your environment? No    Have you ever done Advance Care Planning? (For example, a Health Directive, POLST, or a discussion with a medical provider or your loved ones about your wishes): Yes, patient states has an Advance Care Planning document and will bring a copy to the clinic.       Fall risk  Fallen 2 or more times in the past year?: No  Any fall with injury in the past year?: No    Cognitive Screening   1) Repeat 3 items (Leader, Season, Table)    2) Clock draw: NORMAL  3) 3 item recall: Recalls 3 objects  Results: 3 items recalled: COGNITIVE IMPAIRMENT LESS LIKELY    Mini-CogTM Copyright S Yvonne. Licensed by the author for use in [facility]; reprinted with permission (ruby@.[facility]). All rights reserved.      Do you have sleep apnea, excessive snoring or daytime drowsiness?: no    Reviewed and updated as needed this visit by clinical staff   Tobacco  Allergies  Meds   Med Hx  Surg Hx  Fam Hx  Soc Hx          Reviewed and updated as needed this visit by Provider                   Social History     Tobacco Use     Smoking status: Former Smoker     Smokeless tobacco: Former User   Substance Use Topics     Alcohol use: Yes     Comment: Alcoholic Drinks/day: None since the New Year " "      Alcohol Use 7/13/2022   Prescreen: >3 drinks/day or >7 drinks/week? Yes   AUDIT SCORE  7   Patient Care Team:  Doan Sosa MD as PCP - General (Internal Medicine)  Freda Sabillon as Lead Care Coordinator (Primary Care - CC)  Dona Sosa MD as Assigned PCP    The following health maintenance items are reviewed in Epic and correct as of today:  Health Maintenance Due   Topic Date Due     ANNUAL REVIEW OF  ORDERS  Never done     DEPRESSION ACTION PLAN  Never done     PHQ-9  Never done     LUNG CANCER SCREENING  Never done     AORTIC ANEURYSM SCREENING (SYSTEM ASSIGNED)  Never done     Pneumococcal Vaccine: 65+ Years (2 - PPSV23 or PCV20) 03/29/2018     COLORECTAL CANCER SCREENING  06/18/2020     MEDICARE ANNUAL WELLNESS VISIT  09/17/2021       Review of Systems   Constitutional: Negative for chills and fever.   HENT: Negative for congestion, ear pain, hearing loss and sore throat.    Eyes: Negative for pain and visual disturbance.   Respiratory: Negative for cough and shortness of breath.    Cardiovascular: Negative for chest pain, palpitations and peripheral edema.   Gastrointestinal: Negative for abdominal pain, constipation, diarrhea, heartburn, hematochezia and nausea.   Genitourinary: Negative for dysuria, frequency, genital sores, hematuria, impotence, penile discharge and urgency.   Musculoskeletal: Positive for myalgias. Negative for arthralgias and joint swelling.   Skin: Negative for rash.   Neurological: Negative for dizziness, weakness, headaches and paresthesias.   Psychiatric/Behavioral: Negative for mood changes. The patient is not nervous/anxious.      He wears glasses and has ophthalmology appointment scheduled later this week.  He denies any problems with hearing or recent falls.    OBJECTIVE:   /74 (BP Location: Left arm, Patient Position: Sitting, Cuff Size: Adult Large)   Pulse 78   Resp 17   Ht 1.676 m (5' 6\")   Wt 81.5 kg (179 lb 9.6 oz)   SpO2 97%   BMI 28.99 " "kg/m   Estimated body mass index is 28.99 kg/m  as calculated from the following:    Height as of this encounter: 1.676 m (5' 6\").    Weight as of this encounter: 81.5 kg (179 lb 9.6 oz).  Physical Exam  General: well appearing male., alert and oriented x3  EYES: Eyelids, conjunctiva, and sclera were normal. Pupils were normal.   HEAD, EARS, NOSE, MOUTH, AND THROAT: no cervical LAD, no thyromegaly or nodules appreciated. TMs are visualized and normal, oropharynx is clear  But very crowded, she does have significant nasal congestion and difficulty breathing through his nose.  RESPIRATORY: respirations non labored, CTA bl, no wheezes, rales, no forced expiratory wheezing.  CARDIOVASCULAR: Heart rate and rhythm were normal. No murmurs, rubs,gallops. There was no peripheral edema.   GASTROINTESTINAL: Positive bowel sounds, abdomen is soft, non tender, non distended.     MUSCULOSKELETAL: Muscle mass was normal for age. No joint synovitis or deformity.  Full range of motion in his right shoulder, no pain with internal rotation, empty can test is negative.  Spurling test is negative.  Range of motion in the neck is slightly stiff.  LYMPHATIC: There were no enlarged nodes palpable.  SKIN/HAIR/NAILS: Skin color was normal.  No rashes.  NEUROLOGIC: The patient was alert and oriented.  Speech was normal.  There is no facial asymmetry.   PSYCHIATRIC:  Mood and affect were slightly down.   Rectal exam: No external hemorrhoids, prostate is not enlarged.    ASSESSMENT / PLAN:   Frank was seen today for recheck medication, annual visit and pain.    Diagnoses and all orders for this visit:    Encounter for wellness examination in adult  Discussed colon cancer screening, patient continues to decline colonoscopy, will order Cologuard.  In the past Hemoccult cards were negative for blood.  He will come back for fasting blood work and will have PSA screening done.  He will get his second pneumonia shot today.  -     COLOGUARD(EXACT " SCIENCES)  -     Lipid panel reflex to direct LDL Fasting; Future  -     Comprehensive metabolic panel; Future  -     Lipid panel reflex to direct LDL Fasting  -     Comprehensive metabolic panel  -     PSA, screen    Anemia, unspecified type  This is chronic since 2017, overall stable.  She denies any visible blood in the stool or urine.  In the past stool Hemoccult test was negative.  We will repeat labs today and check UA fall microscopic hematuria.  -     CBC with platelets and differential  -     Iron and iron binding capacity  -     Reticulocyte count  -     Cancel: UA with Microscopic reflex to Culture - lab collect  -     Vitamin B12  -     UA with Microscopic reflex to Culture; Future    Need for vaccination  -     PPSV23, IM/SUBQ (2+ YRS) - [id number]    Chronic right shoulder pain  Etiology is unclear, could be nerve inflammation from vaccination done in November versus trapezius muscle spasm pain around his right scapula on and off.  We will try physical therapy for scapular pain and gabapentin for arm pain.  -     Physical Therapy Referral; Future  -     gabapentin (NEURONTIN) 100 MG capsule; Take 1 capsule (100 mg) by mouth 3 times daily As needed    Trapezius muscle spasm  -     Physical Therapy Referral; Future    Nasal congestion  He uses antihistamine which helps, he did not take it today.  Discussed that he can also use nasal steroid spray which she has at home.    Sleep disorder  She does have a lot of difficulty with sleep and frequently wakes up.  It could be due to her neck nasal congestion and or obstructive sleep apnea since his oropharynx is very crowded.  Will refer to sleep clinic  -     Adult Sleep Eval & Management  Referral; Future    Foot pain, bilateral  No obvious synovitis on exam.  Discussed to purchase wider shoes and if pain continues in his feet, see an orthopedist  -     Orthopedic  Referral; Future    Depression, unspecified depression type  Situational  "depression.  A lot of drug use going on in his building.  He lives in a subsidized building and would like to move but stay in [place].  He has problems with transportation.  Symptoms of depression and moods being irritable sometimes and isolating in his apartment.  Discussed to start on Lexapro.  We will also put a referral for primary care coordination to help with relocation.  -     Primary Care - Care Coordination Referral; Future  -     escitalopram (LEXAPRO) 5 MG tablet; Take 1 tablet (5 mg) by mouth daily    Other orders  -     REVIEW OF HEALTH MAINTENANCE PROTOCOL ORDERS      Estimated body mass index is 28.99 kg/m  as calculated from the following:    Height as of this encounter: 1.676 m (5' 6\").    Weight as of this encounter: 81.5 kg (179 lb 9.6 oz).    He reports that he has quit smoking. He has quit using smokeless tobacco.      Appropriate preventive services were discussed with this patient, including applicable screening as appropriate for cardiovascular disease, diabetes, osteopenia/osteoporosis, and glaucoma.  As appropriate for age/gender, discussed screening for colorectal cancer, prostate cancer, breast cancer, and cervical cancer. Checklist reviewing preventive services available has been given to the patient.    Reviewed patients plan of care and provided an AVS. The Basic Care Plan (routine screening as documented in Health Maintenance) for Frank meets the Care Plan requirement. This Care Plan has been established and reviewed with the Patient.    Dona Sosa MD  [facility]    Identified Health Risks:  "

## 2022-07-13 NOTE — LETTER
July 17, 2022      Frnak Sharp  [address]   [place] [address]        Dear ,    We are writing to inform you of your test results.      Sodium level was slightly low. Please liberalize salt in diet ( or decrease amount of water you are drinking) and  that lab repeated.( order placed).    Red cell count continues to be slightly low indication mild stable anemia. Iron level is elevated. Please stop iron supplement if taking any see hematology. Have stool test done for colon cancer screening.  I put referral in.    Rest of lab test were normal (cholesterol, kidney, liver function and sugar and prostate cancer  screening )      Resulted Orders   Lipid panel reflex to direct LDL Fasting   Result Value Ref Range    Cholesterol 156 <200 mg/dL    Triglycerides 80 <150 mg/dL    Direct Measure HDL 69 >=40 mg/dL    LDL Cholesterol Calculated 71 <=100 mg/dL    Non HDL Cholesterol 87 <130 mg/dL    Narrative    Cholesterol  Desirable:  <200 mg/dL    Triglycerides  Normal:  Less than 150 mg/dL  Borderline High:  150-199 mg/dL  High:  200-499 mg/dL  Very High:  Greater than or equal to 500 mg/dL    Direct Measure HDL  Female:  Greater than or equal to 50 mg/dL   Male:  Greater than or equal to 40 mg/dL    LDL Cholesterol  Desirable:  <100mg/dL  Above Desirable:  100-129 mg/dL   Borderline High:  130-159 mg/dL   High:  160-189 mg/dL   Very High:  >= 190 mg/dL    Non HDL Cholesterol  Desirable:  130 mg/dL  Above Desirable:  130-159 mg/dL  Borderline High:  160-189 mg/dL  High:  190-219 mg/dL  Very High:  Greater than or equal to 220 mg/dL   Comprehensive metabolic panel   Result Value Ref Range    Sodium 126 (L) 136 - 145 mmol/L    Potassium 4.3 3.4 - 5.3 mmol/L    Creatinine 0.85 0.67 - 1.17 mg/dL    Urea Nitrogen 11.4 8.0 - 23.0 mg/dL    Chloride 89 (L) 98 - 107 mmol/L    Carbon Dioxide (CO2) 24 22 - 29 mmol/L    Anion Gap 13 7 - 15 mmol/L    Glucose 99 70 - 99 mg/dL    Calcium 9.3 8.8 - 10.2 mg/dL     Protein Total 7.0 6.4 - 8.3 g/dL    Albumin 4.7 3.5 - 5.2 g/dL    Bilirubin Total 0.8 <=1.2 mg/dL    Alkaline Phosphatase 78 40 - 129 U/L    AST 22 10 - 50 U/L    ALT 15 10 - 50 U/L    GFR Estimate >90 >60 mL/min/1.73m2      Comment:      Effective December 21, 2021 eGFRcr in adults is calculated using the 2021 CKD-EPI creatinine equation which includes age and gender (Corey et al., NE, DOI: 10.[facility]6/DLGByy1109585)   Iron and iron binding capacity   Result Value Ref Range    Iron 178 (H) 61 - 157 ug/dL    Iron Sat Index 57 (H) 15 - 46 %    Iron Binding Capacity 310 240 - 430 ug/dL   Reticulocyte count   Result Value Ref Range    % Reticulocyte 2.1 0.8 - 2.7 %    Absolute Reticulocyte 0.080 0.010 - 0.110 10e6/uL   Vitamin B12   Result Value Ref Range    Vitamin B12 642 232 - 1,245 pg/mL   PSA, screen   Result Value Ref Range    Prostate Specific Antigen Screen 1.41 0.00 - 6.50 ng/mL    Narrative    This result is obtained using the Roche Elecsys total PSA method on the maranda [id number] immunoassay analyzer. Results obtained with different assay methods or kits cannot be used interchangeably.   CBC with platelets and differential   Result Value Ref Range    WBC Count 4.6 4.0 - 11.0 10e3/uL    RBC Count 3.85 (L) 4.40 - 5.90 10e6/uL    Hemoglobin 12.5 (L) 13.3 - 17.7 g/dL    Hematocrit 35.9 (L) 40.0 - 53.0 %    MCV 93 78 - 100 fL    MCH 32.5 26.5 - 33.0 pg    MCHC 34.8 31.5 - 36.5 g/dL    RDW 12.9 10.0 - 15.0 %    Platelet Count 187 150 - 450 10e3/uL    % Neutrophils 46 %    % Lymphocytes 39 %    % Monocytes 12 %    % Eosinophils 3 %    % Basophils 1 %    % Immature Granulocytes 0 %    Absolute Neutrophils 2.1 1.6 - 8.3 10e3/uL    Absolute Lymphocytes 1.8 0.8 - 5.3 10e3/uL    Absolute Monocytes 0.6 0.0 - 1.3 10e3/uL    Absolute Eosinophils 0.1 0.0 - 0.7 10e3/uL    Absolute Basophils 0.0 0.0 - 0.2 10e3/uL    Absolute Immature Granulocytes 0.0 <=0.4 10e3/uL       If you have any questions or concerns, please call the clinic at  the number listed above.       Sincerely,      Dona Sosa MD

## 2022-07-17 ENCOUNTER — TELEPHONE (OUTPATIENT)
Dept: INTERNAL MEDICINE | Facility: CLINIC | Age: 72
End: 2022-07-17

## 2022-07-17 DIAGNOSIS — E87.1 LOW SODIUM LEVELS: ICD-10-CM

## 2022-07-17 DIAGNOSIS — D64.9 ANEMIA, UNSPECIFIED TYPE: Primary | ICD-10-CM

## 2022-07-17 NOTE — TELEPHONE ENCOUNTER
Please let pt know results:    Sodium level was slightly low. Please liberalize salt in diet ( or decrease amount of water you are drinking) and  that lab repeated.( order placed).    Red cell count continues to be slightly low indication mild stable anemia. Iron level is elevated. Please stop iron supplement if taking any see hematology. Have stool test done for colon cancer screening.  I put referral in.    Rest of lab test were normal (cholesterol, kidney, liver function and sugar and prostate cancer  screening )

## 2022-07-18 ENCOUNTER — PATIENT OUTREACH (OUTPATIENT)
Dept: ONCOLOGY | Facility: CLINIC | Age: 72
End: 2022-07-18

## 2022-07-18 NOTE — PROGRESS NOTES
Referral received for benign heme services, see below.    Referral reason: anemia    Current abnormal labs:   Lab Results   Component Value Date    WBC 4.6 07/13/2022     Lab Results   Component Value Date    RBC 3.85 07/13/2022     Lab Results   Component Value Date    HGB 12.5 07/13/2022     Lab Results   Component Value Date    HCT 35.9 07/13/2022     Lab Results   Component Value Date    MCV 93 07/13/2022     Lab Results   Component Value Date    MCH 32.5 07/13/2022     Lab Results   Component Value Date    MCHC 34.8 07/13/2022     Lab Results   Component Value Date    RDW 12.9 07/13/2022     Lab Results   Component Value Date     07/13/2022      and   Iron   Date Value Ref Range Status   07/13/2022 178 (H) 61 - 157 ug/dL Final     Iron Binding Capacity   Date Value Ref Range Status   07/13/2022 310 240 - 430 ug/dL Final   09/29/2021 298 240 - 430 ug/dL Final           Outreach: Call not placed to patient regarding referral.    Plan: Internal Referral: No additional work-up needed, scheduling instructions updated, referral transferred to NPS for completion.

## 2022-07-19 ENCOUNTER — NURSE TRIAGE (OUTPATIENT)
Dept: NURSING | Facility: CLINIC | Age: 72
End: 2022-07-19

## 2022-07-19 NOTE — TELEPHONE ENCOUNTER
FNA relayed PCP's message below.    1) Advised increase in dietary sodium (adding a pinch of salt to soup or any food, eat any salty foods like potato chips or pretzels). Intake should be in moderation. Pt states he does not drink too much water. FNA advised daily recommended water intake which is about 1-2 liters per day (6-8 glasses of water). FNA advised doing this for a few days, then schedule lab appointment next week. Pt would like lab drawn on 7/21 as he is at [facility] for PT    2) Pt informed about hematology referral. Pt states he is not taking any iron supplement. Pt scheduled to see hematology in August.    3) Pt transferred to  for 2 month follow up with PCP (around 9/13).    Pt verbalized understanding of instructions.    Karmen Royal RN/[place] Nurse Advisor

## 2022-07-19 NOTE — TELEPHONE ENCOUNTER
Duplicate encounter. See 7/17 telephone encounter for notes.    Karmen Royal RN/[place] Nurse Advisor

## 2022-07-21 ENCOUNTER — HOSPITAL ENCOUNTER (OUTPATIENT)
Dept: PHYSICAL THERAPY | Facility: REHABILITATION | Age: 72
Discharge: HOME | End: 2022-07-21
Attending: INTERNAL MEDICINE

## 2022-07-21 ENCOUNTER — LAB (OUTPATIENT)
Dept: LAB | Facility: CLINIC | Age: 72
End: 2022-07-21
Payer: COMMERCIAL

## 2022-07-21 DIAGNOSIS — E87.1 LOW SODIUM LEVELS: ICD-10-CM

## 2022-07-21 DIAGNOSIS — M62.838 TRAPEZIUS MUSCLE SPASM: ICD-10-CM

## 2022-07-21 DIAGNOSIS — M25.511 CHRONIC RIGHT SHOULDER PAIN: ICD-10-CM

## 2022-07-21 DIAGNOSIS — E87.1 HYPONATREMIA: ICD-10-CM

## 2022-07-21 DIAGNOSIS — D64.9 ANEMIA, UNSPECIFIED TYPE: ICD-10-CM

## 2022-07-21 DIAGNOSIS — G89.29 CHRONIC RIGHT SHOULDER PAIN: ICD-10-CM

## 2022-07-21 LAB
ALBUMIN UR-MCNC: NEGATIVE MG/DL
ANION GAP SERPL CALCULATED.3IONS-SCNC: 9 MMOL/L (ref 7–15)
APPEARANCE UR: CLEAR
BILIRUB UR QL STRIP: NEGATIVE
BUN SERPL-MCNC: 12.2 MG/DL (ref 8–23)
CALCIUM SERPL-MCNC: 9.2 MG/DL (ref 8.8–10.2)
CHLORIDE SERPL-SCNC: 92 MMOL/L (ref 98–107)
COLOR UR AUTO: YELLOW
CREAT SERPL-MCNC: 0.93 MG/DL (ref 0.67–1.17)
DEPRECATED HCO3 PLAS-SCNC: 25 MMOL/L (ref 22–29)
GFR SERPL CREATININE-BSD FRML MDRD: 88 ML/MIN/1.73M2
GLUCOSE SERPL-MCNC: 102 MG/DL (ref 70–99)
GLUCOSE UR STRIP-MCNC: NEGATIVE MG/DL
HGB UR QL STRIP: NEGATIVE
KETONES UR STRIP-MCNC: 15 MG/DL
LEUKOCYTE ESTERASE UR QL STRIP: NEGATIVE
NITRATE UR QL: NEGATIVE
PH UR STRIP: 6.5 [PH] (ref 5–8)
POTASSIUM SERPL-SCNC: 4.1 MMOL/L (ref 3.4–5.3)
SODIUM SERPL-SCNC: 126 MMOL/L (ref 136–145)
SP GR UR STRIP: 1.01 (ref 1–1.03)
UROBILINOGEN UR STRIP-ACNC: 0.2 E.U./DL

## 2022-07-21 PROCEDURE — 80048 BASIC METABOLIC PNL TOTAL CA: CPT

## 2022-07-21 PROCEDURE — 81003 URINALYSIS AUTO W/O SCOPE: CPT

## 2022-07-21 PROCEDURE — 36415 COLL VENOUS BLD VENIPUNCTURE: CPT

## 2022-07-21 PROCEDURE — 84443 ASSAY THYROID STIM HORMONE: CPT

## 2022-07-21 NOTE — PROGRESS NOTES
[facility]    OUTPATIENT PHYSICAL THERAPY ORTHOPEDIC EVALUATION  PLAN OF TREATMENT FOR OUTPATIENT REHABILITATION  (COMPLETE FOR INITIAL CLAIMS ONLY)  Patient's Last Name, First Name, M.I.  YOB: 1950  Frank Sharp    Provider s Name:  [facility]   Medical Record No.  [id number]   Start of Care Date:  07/21/22   Onset Date:  11/01/21   Type:     _X__PT   ___OT   ___SLP Medical Diagnosis:  (P) chronic right shoulder pain, trapezius muscle spasm     PT Diagnosis:  (P) reduced cervical and scapular muscle length and increased muscle tension, cervical radiculopathy   Visits from SOC:  1      _________________________________________________________________________________  Plan of Treatment/Functional Goals:  (P) joint mobilization, manual therapy, neuromuscular re-education, ROM, stretching, strengthening     (P) Hot packs, Cryotherapy, Traction, Ultrasound     Goals  Goal Identifier: (P) HEP  Goal Description: (P) Pt will demonstrate independence and report compliance with HEP to facilitate improved independent symptom mgmt.  Target Date: (P) 10/13/22    Goal Identifier: (P) sleep  Goal Description: (P) pt will report ability to sleep throughout the night without waking due to pain 5/7 night per week to facilitate improved QOL.  Target Date: (P) 10/13/22    Goal Identifier: (P) pain  Goal Description: (P) pt will report less than or equal to 3/10 worst pain throughout his right scapula to faciltiate improved QOL.  Target Date: (P) 10/13/22       Therapy Frequency:  (P) 1 time/week (up to every other week)  Predicted Duration of Therapy Intervention:  (P) 6-8 visits over 10 weeks (up to every other week)    Caron Hogue, PT                 I CERTIFY THE NEED FOR THESE SERVICES FURNISHED UNDER        THIS PLAN OF TREATMENT AND WHILE UNDER MY CARE     (Physician  co-signature of this document indicates review and certification of the therapy plan).                     Certification Date From:  (P) 07/21/22   Certification Date To:  (P) 10/13/22    Referring Provider:  Dona Sosa MD    Initial Assessment        See Epic Evaluation Start of Care Date: 07/21/22 07/21/22 1200   General Information   Type of Visit Initial OP Ortho PT Evaluation   Start of Care Date 07/21/22   Referring Physician Dona Sosa MD   Certification Required? Yes   Medical Diagnosis chronic right shoulder pain, trapezius muscle spasm   Surgical/Medical history reviewed Yes   Body Part(s)   Body Part(s) Cervical Spine;Shoulder   Presentation and Etiology   Pertinent history of current problem (include personal factors and/or comorbidities that impact the POC) Pt reports onset was when he got vaccinated (shingles) in November of 2021. Pt reports pain is is along the top of his shoulder blade as well as tingling/numbness from his right shoulder to hand. Pain is increased when supine or right side lying, but intermittent. Pt walks ~ 1 mile per day.   Impairments A. Pain;K. Numbness;L. Tingling   How/Where did it occur From insidious onset   Onset date of current episode/exacerbation 11/01/21   Chronicity Chronic   Pain rating (0-10 point scale) Best (/10);Worst (/10);Other   Best (/10) 2/10   Worst (/10) 9/10   Pain rating comment current: 2/10   Pain quality D. Burning   Frequency of pain/symptoms B. Intermittent   Pain/symptoms are: Worse in the morning   Pain/symptoms eased by D. Nothing;K. Other   Pain eased by comment chiropractor in the past   Progression of symptoms since onset: Unchanged   Current Level of Function   Patient role/employment history F. Retired   Fall Risk Screen   Fall screen completed by PT   Have you fallen 2 or more times in the past year? No   Have you fallen and had an injury in the past year? No   Is patient a fall risk? No   Abuse Screen (yes  response referral indicated)   Feels Unsafe at Home or Work/School no   Feels Threatened by Someone no   Does Anyone Try to Keep You From Having Contact with Others or Doing Things Outside Your Home? no   Physical Signs of Abuse Present no   Patient needs abuse support services and resources No   System Outcome Measures   Outcome Measures   (SPADI: 18/130)   Cervical Spine   Cervical Left Side Bending ROM 25, right upper scapular tightness and burning   Cervical Right Rotation ROM 42   Cervical Left Rotation ROM 44   Cervical Flexion ROM 45, increased burning right upper scapula   Cervical Extension ROM 42, increased burning right upper scapula   Cervical Right Side Bending ROM , right upper scapular tightness and burning   Shoulder AROM Screen flexion, abduction, ER and IR WFL and without exacerbation of symptoms   Cervical/Thoracic/Shoulder ROM Comments repeated movements: cervical retraction and rotation-no exacerbation of symptoms   Shoulder Shrug (C2-C4) Strength 5/5 yfn   Shoulder Abd (C5) Strength 5/5 yfn   Shoulder ER (C5, C6) Strength 5/5 yfn, mild increase in R shoulder pain   Shoulder IR (C5, C6) Strength 5/5 yfn   Elbow Flexion (C5, C6) Strength 5/5 yfn   Elbow Extension (C7) Strength 5/5 yfn   Wrist Extension (C6) Strength 5/5 yfn   Wrist Flexion (C7) Strength 5/5 yfn   Thumb Abd (C8) Strength 5/5 yfn   5th Finger Add (T1) Strength 5/5 yfn   Upper Trapezius Flexibility mod limited yfn   Levator Scapula Flexibility mod limited yfn   Pectoralis Minor Flexibility mod limited yfn   Spurling Test negative   Cervical Distraction Test negative   Neer Impingement Test negative   Gloria Impingement Test negative   Segmental Mobility-Cervical hypomobile   Segmental Mobility-Thoracic hypomobile   Palpation increased tenderness throughout bilatera upper trapezius (R>L), levator scapula (R>L), superior border of scapular on the right   Dermatome/Sensory Testing intact   Posture FHRS, decreased lumbar lordosis    Shoulder Objective Findings   Shoulder ROM Comment flexion and abduction WNL, mod decreased IR and ER but without exerbation of symptoms   Pec Minor (supine) Flexibility mod limitations   Planned Therapy Interventions   Planned Therapy Interventions joint mobilization;manual therapy;neuromuscular re-education;ROM;stretching;strengthening   Planned Modality Interventions   Planned Modality Interventions Hot packs;Cryotherapy;Traction;Ultrasound   Clinical Impression   Criteria for Skilled Therapeutic Interventions Met yes, treatment indicated   PT Diagnosis reduced cervical and scapular muscle length and increased muscle tension, cervical radiculopathy   Clinical Presentation Stable/Uncomplicated   Clinical Decision Making (Complexity) Low complexity   Therapy Frequency 1 time/week  (up to every other week)   Predicted Duration of Therapy Intervention (days/wks) 6-8 visits over 10 weeks  (up to every other week)   Risk & Benefits of therapy have been explained Yes   Patient, Family & other staff in agreement with plan of care Yes   Clinical Impression Comments Pt presents with insidious onset of right scapular pain with occasional radiation of symptoms from the right shoulder to wrist. Pt unable to determine movements or activities that bring on his pain, however, notes a mild increase with cervical AROM. Pt also demonstrates reduced cervical ROM, decreased cervical/scapular muscle length, postural impairments, and significant increase in tone throughout upper trapezius muscles bilaterally. Signs and symptoms are consistent with possible cervical radiculopathy and/or myofascial pain. Pt would benefit from skilled physical therapy to address limitations to facilitate maximal return to PLOF.   ORTHO GOALS   PT Ortho Eval Goals 1;2;3   Ortho Goal 1   Goal Identifier HEP   Goal Description Pt will demonstrate independence and report compliance with HEP to facilitate improved independent symptom mgmt.   Target Date  10/13/22   Ortho Goal 2   Goal Identifier sleep   Goal Description pt will report ability to sleep throughout the night without waking due to pain 5/7 night per week to facilitate improved QOL.   Target Date 10/13/22   Ortho Goal 3   Goal Identifier pain   Goal Description pt will report less than or equal to 3/10 worst pain throughout his right scapula to faciltiate improved QOL.   Target Date 10/13/22   Total Evaluation Time   PT Eval, Low Complexity Minutes (40344) 32   Therapy Certification   Certification date from 07/21/22   Certification date to 10/13/22   Medical Diagnosis chronic right shoulder pain, trapezius muscle spasm   Caron Hogue, PT

## 2022-07-22 ENCOUNTER — TELEPHONE (OUTPATIENT)
Dept: INTERNAL MEDICINE | Facility: CLINIC | Age: 72
End: 2022-07-22

## 2022-07-22 DIAGNOSIS — E87.1 HYPONATREMIA: Primary | ICD-10-CM

## 2022-07-22 NOTE — TELEPHONE ENCOUNTER
Please let patient know.    Urine test is negative for infection.  Sodium is still on the lower side but have not changed from previous.  I would recommend that he decrease his amount of water that he drinks or increase his amount of salt that he consumes (for hydration he could use electrolyte drinks like Gatorade which does have some salt in it instead of plain water).  I would like him to have repeat blood work done again in a month.  Orders placed.

## 2022-07-23 LAB — TSH SERPL DL<=0.005 MIU/L-ACNC: 3.26 UIU/ML (ref 0.3–4.2)

## 2022-07-26 LAB — NONINV COLON CA DNA+OCC BLD SCRN STL QL: NEGATIVE

## 2022-07-28 NOTE — TELEPHONE ENCOUNTER
Patient called back, relayed message. Patient understands and lab appointment scheduled for next month.

## 2022-08-04 ENCOUNTER — PATIENT OUTREACH (OUTPATIENT)
Dept: ONCOLOGY | Facility: HOSPITAL | Age: 72
End: 2022-08-04

## 2022-08-04 ENCOUNTER — ONCOLOGY VISIT (OUTPATIENT)
Dept: ONCOLOGY | Facility: HOSPITAL | Age: 72
End: 2022-08-04
Attending: INTERNAL MEDICINE
Payer: COMMERCIAL

## 2022-08-04 ENCOUNTER — PATIENT OUTREACH (OUTPATIENT)
Dept: GERIATRIC MEDICINE | Facility: CLINIC | Age: 72
End: 2022-08-04

## 2022-08-04 VITALS
RESPIRATION RATE: 20 BRPM | DIASTOLIC BLOOD PRESSURE: 81 MMHG | WEIGHT: 178.7 LBS | OXYGEN SATURATION: 97 % | HEIGHT: 66 IN | TEMPERATURE: 98 F | SYSTOLIC BLOOD PRESSURE: 141 MMHG | HEART RATE: 74 BPM | BODY MASS INDEX: 28.72 KG/M2

## 2022-08-04 DIAGNOSIS — K75.2 NONSPECIFIC REACTIVE HEPATITIS: ICD-10-CM

## 2022-08-04 DIAGNOSIS — D64.9 ANEMIA, UNSPECIFIED TYPE: ICD-10-CM

## 2022-08-04 DIAGNOSIS — F32.A DEPRESSION, UNSPECIFIED DEPRESSION TYPE: ICD-10-CM

## 2022-08-04 PROCEDURE — 99204 OFFICE O/P NEW MOD 45 MIN: CPT | Performed by: INTERNAL MEDICINE

## 2022-08-04 PROCEDURE — G0463 HOSPITAL OUTPT CLINIC VISIT: HCPCS

## 2022-08-04 ASSESSMENT — PAIN SCALES - GENERAL: PAINLEVEL: NO PAIN (0)

## 2022-08-04 NOTE — LETTER
"    8/4/2022         RE: Frank Sharp  [address]  [place] [address]        Dear Colleague,    Thank you for referring your patient, Frank Sharp, to the [facility] [place]. Please see a copy of my visit note below.    Oncology Rooming Note    August 4, 2022 1:26 PM   Frank Sharp is a 72 year old male who presents for:    Chief Complaint   Patient presents with     Hematology     New Patient - Anemia, unspecified type     Initial Vitals: BP (!) 141/81 (BP Location: Left arm, Patient Position: Sitting, Cuff Size: Adult Regular)   Pulse 74   Temp 98  F (36.7  C) (Oral)   Resp 20   Ht 1.664 m (5' 5.5\")   Wt 81.1 kg (178 lb 11.2 oz)   SpO2 97%   BMI 29.28 kg/m   Estimated body mass index is 29.28 kg/m  as calculated from the following:    Height as of this encounter: 1.664 m (5' 5.5\").    Weight as of this encounter: 81.1 kg (178 lb 11.2 oz). Body surface area is 1.94 meters squared.  No Pain (0) Comment: Data Unavailable   No LMP for male patient.  Allergies reviewed: Yes  Medications reviewed: Yes    Medications: Medication refills not needed today.  Pharmacy name entered into [email or web address]: [id number] - [place], [place] - [address]    Clinical concerns: New Patient - Anemia, unspecified type.      Leilani Arce, [facility] Hematology and Oncology Consult Note    Patient: Frank Sharp  MRN: [id number]  Date of Service: Aug 4, 2022           Reason for consultation      Problem List Items Addressed This Visit    None     Visit Diagnoses     Anemia, unspecified type        Relevant Orders    Comprehensive metabolic panel    Ferritin    CBC with platelets    Lactate Dehydrogenase    GGT    Nonspecific reactive hepatitis         Relevant Orders    GGT            Assessment / number of problems addressed      1.  A very pleasant 72 year old gentleman with mild anemia.  This appears to be most likely due to nutritional reasons.  " Plan #1 suspicion is that perhaps he is drinking little bit more than he should be.  2.  Hyponatremia.  This again is most likely due to nutritional reasons most likely somewhat excessive alcohol consumption.  3. Mild arthritis stable symptoms of  4  otherwise good general health.    Plan and medical decision making      1.  At this time recommend that he should completely stop alcohol and then come back in 4 months timeframe for repeat lab testing  2.  Vies the patient that he may need some help to completely quit drinking alcohol.  He has told me that during Lent season he is able to give up alcohol so he might be able to pull it off.  3.  Advised the patient that he might be better off eating a healthy nutritious diet.  4.  Continue with good exercise.  5.  Follow-up with your regular doctor other medical issues.    Clinical/pathological stage      Cancer Staging  No matching staging information was found for the patient.    History of present illness      Mr. Frank Sharp is a 72 year old gentleman who has been referred to me for evaluation of anemia.  His hemoglobin has been hovering around 12.  Normocytic normochromic in type with normal vitamin B12 level.  Iron studies also negative for any iron deficiency.  His other labs have indicated low sodium for quite some time.  Occasional liver function test elevation was also noted.  He also had a PSA elevation in 2018 which normalized in 2019.      Detailed review of systems      A 14 point review of systems was obtained.  Positive findings noted in the history.  Rest of the review of system is otherwise negative.      Past medical/surgical/social/family history        No past medical history on file.  No past surgical history on file.  Family History   Problem Relation Age of Onset     Diabetes Father      Diabetes Sister      Diabetes Brother      Diabetes Paternal Aunt      Diabetes Paternal Uncle      Social History     Socioeconomic History     Marital  "status: Single     Spouse name: None     Number of children: None     Years of education: None     Highest education level: None   Tobacco Use     Smoking status: Former Smoker     Smokeless tobacco: Former User   Vaping Use     Vaping Use: Never used   Substance and Sexual Activity     Alcohol use: Yes     Comment: Alcoholic Drinks/day: None since the New Year     Drug use: No     Sexual activity: Not Currently   Social History Narrative    Former manual  on disability.         Allergies      No Known Allergies      Physical exam        BP (!) 141/81 (BP Location: Left arm, Patient Position: Sitting, Cuff Size: Adult Regular)   Pulse 74   Temp 98  F (36.7  C) (Oral)   Resp 20   Ht 1.664 m (5' 5.5\")   Wt 81.1 kg (178 lb 11.2 oz)   SpO2 97%   BMI 29.28 kg/m        GENERAL: Alert and oriented to time place and person. Seated comfortably. In no distress.    HEAD: Atraumatic and normocephalic.    EYES: RUSTY, EOMI. No pallor. No icterus.    Oral cavity: no mucosal lesion or tonsillar enlargement.    NECK: supple. JVP normal.No thyroid enlargement.    LYMPH NODES: No palpable, cervical, axillary or inguinal lymphadenopathy.    CHEST: clear to auscultation bilaterally. Symmetrical breath movements bilaterally.    CVS: S1 and S2 are Regular rate and rhythm. No murmur or gallop or rub heard. No peripheral edema.    ABDOMEN: Soft. Not tender. Not distended. No palpable hepatomegaly or splenomegaly. No other mass palpable. Bowel sounds heard.    EXTREMITIES: Warm.  Minimal arthritic changes.    NEUROLOGICAL: Alert awake oriented.  Otherwise intact.  Cranial nerves appears to be preserved.    SKIN: no rash, or bruising or purpura.      Laboratory data      I reviewed his lab data from his chart both from our system other systems.  He has had some anemia for quite some time.  Normal platelet count.  His liver function test have been elevated in the past.  His hyponatremia is actually somewhat chronic.  He did have " an elevated PSA a few years ago but then normalized.    Imaging results          This note has been dictated using voice recognition software. Any grammatical or context distortions are unintentional and inherent to the software      Signed by: Taiwo Sanchez MD, MD        Again, thank you for allowing me to participate in the care of your patient.        Sincerely,        Taiwo Sanchez MD, MD

## 2022-08-04 NOTE — PROGRESS NOTES
"Oncology Rooming Note    August 4, 2022 1:26 PM   Frank Sharp is a 72 year old male who presents for:    Chief Complaint   Patient presents with     Hematology     New Patient - Anemia, unspecified type     Initial Vitals: BP (!) 141/81 (BP Location: Left arm, Patient Position: Sitting, Cuff Size: Adult Regular)   Pulse 74   Temp 98  F (36.7  C) (Oral)   Resp 20   Ht 1.664 m (5' 5.5\")   Wt 81.1 kg (178 lb 11.2 oz)   SpO2 97%   BMI 29.28 kg/m   Estimated body mass index is 29.28 kg/m  as calculated from the following:    Height as of this encounter: 1.664 m (5' 5.5\").    Weight as of this encounter: 81.1 kg (178 lb 11.2 oz). Body surface area is 1.94 meters squared.  No Pain (0) Comment: Data Unavailable   No LMP for male patient.  Allergies reviewed: Yes  Medications reviewed: Yes    Medications: Medication refills not needed today.  Pharmacy name entered into [employer]: [id number] - [place], [place] - [address]    Clinical concerns: New Patient - Anemia, unspecified type.      Leilani Arce [facility]              "

## 2022-08-04 NOTE — PROGRESS NOTES
Spoke with member regarding his housing situation. Member reported that he feels depress due to the drug use and non-residents that are coming into the building where he lives. Member reported that he would like to move somewhere else in [place]. CC stated that he would reach out to Community Health Worker, Felicitas, since she deals with housing and will get back to member.     Freda Sabillon [facility]  [phone]

## 2022-08-05 RX ORDER — ESCITALOPRAM OXALATE 5 MG/1
TABLET ORAL
Qty: 30 TABLET | Refills: 3 | OUTPATIENT
Start: 2022-08-05

## 2022-08-07 NOTE — PROGRESS NOTES
[facility] Hematology and Oncology Consult Note    Patient: Frank Sharp  MRN: [id number]  Date of Service: Aug 4, 2022           Reason for consultation      Problem List Items Addressed This Visit    None     Visit Diagnoses     Anemia, unspecified type        Relevant Orders    Comprehensive metabolic panel    Ferritin    CBC with platelets    Lactate Dehydrogenase    GGT    Nonspecific reactive hepatitis         Relevant Orders    GGT            Assessment / number of problems addressed      1.  A very pleasant 72 year old gentleman with mild anemia.  This appears to be most likely due to nutritional reasons.  Plan #1 suspicion is that perhaps he is drinking little bit more than he should be.  2.  Hyponatremia.  This again is most likely due to nutritional reasons most likely somewhat excessive alcohol consumption.  3. Mild arthritis stable symptoms of  4  otherwise good general health.    Plan and medical decision making      1.  At this time recommend that he should completely stop alcohol and then come back in 4 months timeframe for repeat lab testing  2.  Vies the patient that he may need some help to completely quit drinking alcohol.  He has told me that during Lent season he is able to give up alcohol so he might be able to pull it off.  3.  Advised the patient that he might be better off eating a healthy nutritious diet.  4.  Continue with good exercise.  5.  Follow-up with your regular doctor other medical issues.    Clinical/pathological stage      Cancer Staging  No matching staging information was found for the patient.    History of present illness      Mr. Frank Sharp is a 72 year old gentleman who has been referred to me for evaluation of anemia.  His hemoglobin has been hovering around 12.  Normocytic normochromic in type with normal vitamin B12 level.  Iron studies also negative for any iron deficiency.  His other labs have indicated low sodium for quite some time.  Occasional liver  "function test elevation was also noted.  He also had a PSA elevation in 2018 which normalized in 2019.      Detailed review of systems      A 14 point review of systems was obtained.  Positive findings noted in the history.  Rest of the review of system is otherwise negative.      Past medical/surgical/social/family history        No past medical history on file.  No past surgical history on file.  Family History   Problem Relation Age of Onset     Diabetes Father      Diabetes Sister      Diabetes Brother      Diabetes Paternal Aunt      Diabetes Paternal Uncle      Social History     Socioeconomic History     Marital status: Single     Spouse name: None     Number of children: None     Years of education: None     Highest education level: None   Tobacco Use     Smoking status: Former Smoker     Smokeless tobacco: Former User   Vaping Use     Vaping Use: Never used   Substance and Sexual Activity     Alcohol use: Yes     Comment: Alcoholic Drinks/day: None since the New Year     Drug use: No     Sexual activity: Not Currently   Social History Narrative    Former manual  on disability.         Allergies      No Known Allergies      Physical exam        BP (!) 141/81 (BP Location: Left arm, Patient Position: Sitting, Cuff Size: Adult Regular)   Pulse 74   Temp 98  F (36.7  C) (Oral)   Resp 20   Ht 1.664 m (5' 5.5\")   Wt 81.1 kg (178 lb 11.2 oz)   SpO2 97%   BMI 29.28 kg/m        GENERAL: Alert and oriented to time place and person. Seated comfortably. In no distress.    HEAD: Atraumatic and normocephalic.    EYES: RUSTY, EOMI. No pallor. No icterus.    Oral cavity: no mucosal lesion or tonsillar enlargement.    NECK: supple. JVP normal.No thyroid enlargement.    LYMPH NODES: No palpable, cervical, axillary or inguinal lymphadenopathy.    CHEST: clear to auscultation bilaterally. Symmetrical breath movements bilaterally.    CVS: S1 and S2 are Regular rate and rhythm. No murmur or gallop or rub heard. No " peripheral edema.    ABDOMEN: Soft. Not tender. Not distended. No palpable hepatomegaly or splenomegaly. No other mass palpable. Bowel sounds heard.    EXTREMITIES: Warm.  Minimal arthritic changes.    NEUROLOGICAL: Alert awake oriented.  Otherwise intact.  Cranial nerves appears to be preserved.    SKIN: no rash, or bruising or purpura.      Laboratory data      I reviewed his lab data from his chart both from our system other systems.  He has had some anemia for quite some time.  Normal platelet count.  His liver function test have been elevated in the past.  His hyponatremia is actually somewhat chronic.  He did have an elevated PSA a few years ago but then normalized.    Imaging results          This note has been dictated using voice recognition software. Any grammatical or context distortions are unintentional and inherent to the software      Signed by: Taiwo Sanchez MD, MD

## 2022-08-11 ENCOUNTER — PATIENT OUTREACH (OUTPATIENT)
Dept: GERIATRIC MEDICINE | Facility: CLINIC | Age: 72
End: 2022-08-11

## 2022-08-11 NOTE — PROGRESS NOTES
[facility] Care Coordination Contact   CHW, per CC  Bee followed up w/ mbr on housing. Mbr would like to find new place in [place] area of \[facility]"" and get into subsidized complex. CHW,reviewed process for this type of housing and explained long wait list. Mbr, is not able to use technology and does not have  family  who can help him.    Next steps CHW will  Identify  possible locations  for mbr to follow up on. CHW encouraged mbr to stay in touch w/ existing housing contacts he has established.    MARK Lanier  [facility]  [phone]

## 2022-08-16 ENCOUNTER — PATIENT OUTREACH (OUTPATIENT)
Dept: GERIATRIC MEDICINE | Facility: CLINIC | Age: 72
End: 2022-08-16

## 2022-08-16 NOTE — PROGRESS NOTES
[facility] Care Coordination Contact     CHW, followed up w/ mbr on housing information. CHW sent mbr  application for housing at [facility] in [place] also listing of property mgmt  contacts to call . Mbr, noted he would follow up and call CHW if questions  after review of info.      MARK Lanier  [facility]  [phone]

## 2022-08-19 ENCOUNTER — PATIENT OUTREACH (OUTPATIENT)
Dept: GERIATRIC MEDICINE | Facility: CLINIC | Age: 72
End: 2022-08-19

## 2022-08-19 NOTE — PROGRESS NOTES
CC updated program tasks and targets for [employer] launch.    ELADIA Morrison  [facility]  [phone]

## 2022-08-22 ENCOUNTER — LAB (OUTPATIENT)
Dept: LAB | Facility: CLINIC | Age: 72
End: 2022-08-22
Payer: COMMERCIAL

## 2022-08-22 DIAGNOSIS — E87.1 HYPONATREMIA: ICD-10-CM

## 2022-08-22 LAB
ANION GAP SERPL CALCULATED.3IONS-SCNC: 9 MMOL/L (ref 7–15)
BUN SERPL-MCNC: 16.7 MG/DL (ref 8–23)
CALCIUM SERPL-MCNC: 9.1 MG/DL (ref 8.8–10.2)
CHLORIDE SERPL-SCNC: 95 MMOL/L (ref 98–107)
CREAT SERPL-MCNC: 1.02 MG/DL (ref 0.67–1.17)
DEPRECATED HCO3 PLAS-SCNC: 27 MMOL/L (ref 22–29)
GFR SERPL CREATININE-BSD FRML MDRD: 79 ML/MIN/1.73M2
GLUCOSE SERPL-MCNC: 100 MG/DL (ref 70–99)
POTASSIUM SERPL-SCNC: 4.2 MMOL/L (ref 3.4–5.3)
SODIUM SERPL-SCNC: 131 MMOL/L (ref 136–145)

## 2022-08-22 PROCEDURE — 36415 COLL VENOUS BLD VENIPUNCTURE: CPT

## 2022-08-22 PROCEDURE — 80048 BASIC METABOLIC PNL TOTAL CA: CPT

## 2022-08-25 ENCOUNTER — TELEPHONE (OUTPATIENT)
Dept: INTERNAL MEDICINE | Facility: CLINIC | Age: 72
End: 2022-08-25

## 2022-08-25 NOTE — TELEPHONE ENCOUNTER
Please let pt know,  Sodium level has improved, but still slightly below normal.  Continue lower water intake and more gadorate or electrolytes drinks with sodium.

## 2022-09-12 ENCOUNTER — OFFICE VISIT (OUTPATIENT)
Dept: INTERNAL MEDICINE | Facility: CLINIC | Age: 72
End: 2022-09-12
Payer: COMMERCIAL

## 2022-09-12 VITALS
HEIGHT: 65 IN | HEART RATE: 73 BPM | TEMPERATURE: 97.7 F | RESPIRATION RATE: 16 BRPM | WEIGHT: 173.5 LBS | OXYGEN SATURATION: 98 % | DIASTOLIC BLOOD PRESSURE: 76 MMHG | BODY MASS INDEX: 28.91 KG/M2 | SYSTOLIC BLOOD PRESSURE: 110 MMHG

## 2022-09-12 DIAGNOSIS — H53.9 VISION CHANGES: ICD-10-CM

## 2022-09-12 DIAGNOSIS — E87.1 HYPONATREMIA: ICD-10-CM

## 2022-09-12 DIAGNOSIS — G47.00 INSOMNIA, UNSPECIFIED TYPE: ICD-10-CM

## 2022-09-12 DIAGNOSIS — F32.9 REACTIVE DEPRESSION: ICD-10-CM

## 2022-09-12 DIAGNOSIS — N40.1 BENIGN NON-NODULAR PROSTATIC HYPERPLASIA WITH LOWER URINARY TRACT SYMPTOMS: ICD-10-CM

## 2022-09-12 DIAGNOSIS — H33.20 RETINAL DETACHMENT, UNSPECIFIED LATERALITY: ICD-10-CM

## 2022-09-12 DIAGNOSIS — E78.00 PURE HYPERCHOLESTEROLEMIA: ICD-10-CM

## 2022-09-12 DIAGNOSIS — D64.9 ANEMIA, UNSPECIFIED TYPE: Primary | ICD-10-CM

## 2022-09-12 DIAGNOSIS — R09.81 NASAL CONGESTION: ICD-10-CM

## 2022-09-12 DIAGNOSIS — J34.2 DEVIATED NASAL SEPTUM: ICD-10-CM

## 2022-09-12 LAB
ANION GAP SERPL CALCULATED.3IONS-SCNC: 5 MMOL/L (ref 7–15)
BASOPHILS # BLD AUTO: 0 10E3/UL (ref 0–0.2)
BASOPHILS NFR BLD AUTO: 1 %
BUN SERPL-MCNC: 15.3 MG/DL (ref 8–23)
CALCIUM SERPL-MCNC: 9.7 MG/DL (ref 8.8–10.2)
CHLORIDE SERPL-SCNC: 99 MMOL/L (ref 98–107)
CREAT SERPL-MCNC: 0.97 MG/DL (ref 0.67–1.17)
DEPRECATED HCO3 PLAS-SCNC: 31 MMOL/L (ref 22–29)
EOSINOPHIL # BLD AUTO: 0.1 10E3/UL (ref 0–0.7)
EOSINOPHIL NFR BLD AUTO: 2 %
ERYTHROCYTE [DISTWIDTH] IN BLOOD BY AUTOMATED COUNT: 12.7 % (ref 10–15)
FOLATE SERPL-MCNC: 29.2 NG/ML (ref 4.6–34.8)
GFR SERPL CREATININE-BSD FRML MDRD: 83 ML/MIN/1.73M2
GLUCOSE SERPL-MCNC: 108 MG/DL (ref 70–99)
HCT VFR BLD AUTO: 37.3 % (ref 40–53)
HGB BLD-MCNC: 12.7 G/DL (ref 13.3–17.7)
IMM GRANULOCYTES # BLD: 0 10E3/UL
IMM GRANULOCYTES NFR BLD: 0 %
LYMPHOCYTES # BLD AUTO: 1.4 10E3/UL (ref 0.8–5.3)
LYMPHOCYTES NFR BLD AUTO: 39 %
MCH RBC QN AUTO: 32.2 PG (ref 26.5–33)
MCHC RBC AUTO-ENTMCNC: 34 G/DL (ref 31.5–36.5)
MCV RBC AUTO: 95 FL (ref 78–100)
MONOCYTES # BLD AUTO: 0.4 10E3/UL (ref 0–1.3)
MONOCYTES NFR BLD AUTO: 10 %
NEUTROPHILS # BLD AUTO: 1.8 10E3/UL (ref 1.6–8.3)
NEUTROPHILS NFR BLD AUTO: 48 %
PLATELET # BLD AUTO: 188 10E3/UL (ref 150–450)
POTASSIUM SERPL-SCNC: 4.4 MMOL/L (ref 3.4–5.3)
RBC # BLD AUTO: 3.94 10E6/UL (ref 4.4–5.9)
SODIUM SERPL-SCNC: 135 MMOL/L (ref 136–145)
TSH SERPL DL<=0.005 MIU/L-ACNC: 3.62 UIU/ML (ref 0.3–4.2)
WBC # BLD AUTO: 3.7 10E3/UL (ref 4–11)

## 2022-09-12 PROCEDURE — 80048 BASIC METABOLIC PNL TOTAL CA: CPT | Performed by: INTERNAL MEDICINE

## 2022-09-12 PROCEDURE — 99000 SPECIMEN HANDLING OFFICE-LAB: CPT | Performed by: INTERNAL MEDICINE

## 2022-09-12 PROCEDURE — 84425 ASSAY OF VITAMIN B-1: CPT | Mod: 90 | Performed by: INTERNAL MEDICINE

## 2022-09-12 PROCEDURE — 36415 COLL VENOUS BLD VENIPUNCTURE: CPT | Performed by: INTERNAL MEDICINE

## 2022-09-12 PROCEDURE — 85025 COMPLETE CBC W/AUTO DIFF WBC: CPT | Performed by: INTERNAL MEDICINE

## 2022-09-12 PROCEDURE — 99214 OFFICE O/P EST MOD 30 MIN: CPT | Performed by: INTERNAL MEDICINE

## 2022-09-12 PROCEDURE — 84443 ASSAY THYROID STIM HORMONE: CPT | Performed by: INTERNAL MEDICINE

## 2022-09-12 PROCEDURE — 82746 ASSAY OF FOLIC ACID SERUM: CPT | Performed by: INTERNAL MEDICINE

## 2022-09-12 RX ORDER — ATORVASTATIN CALCIUM 20 MG/1
TABLET, FILM COATED ORAL
Qty: 90 TABLET | Refills: 2 | Status: SHIPPED | OUTPATIENT
Start: 2022-09-12 | End: 2022-09-19

## 2022-09-12 RX ORDER — FLUTICASONE PROPIONATE 50 MCG
1 SPRAY, SUSPENSION (ML) NASAL DAILY
Qty: 16 G | Refills: 11 | Status: SHIPPED | OUTPATIENT
Start: 2022-09-12 | End: 2023-02-10

## 2022-09-12 RX ORDER — TAMSULOSIN HYDROCHLORIDE 0.4 MG/1
CAPSULE ORAL
Qty: 90 CAPSULE | Refills: 2 | Status: SHIPPED | OUTPATIENT
Start: 2022-09-12 | End: 2022-09-19

## 2022-09-12 RX ORDER — TRAZODONE HYDROCHLORIDE 50 MG/1
TABLET, FILM COATED ORAL
Qty: 40 TABLET | Refills: 1 | Status: SHIPPED | OUTPATIENT
Start: 2022-09-12 | End: 2024-05-06

## 2022-09-12 RX ORDER — TRAZODONE HYDROCHLORIDE 50 MG/1
TABLET, FILM COATED ORAL
Qty: 40 TABLET | Refills: 1 | Status: SHIPPED | OUTPATIENT
Start: 2022-09-12 | End: 2022-09-12

## 2022-09-12 RX ORDER — FLUTICASONE PROPIONATE 50 MCG
1 SPRAY, SUSPENSION (ML) NASAL DAILY
Qty: 16 G | Refills: 11 | Status: SHIPPED | OUTPATIENT
Start: 2022-09-12 | End: 2022-09-12

## 2022-09-12 ASSESSMENT — PATIENT HEALTH QUESTIONNAIRE - PHQ9: SUM OF ALL RESPONSES TO PHQ QUESTIONS 1-9: 5

## 2022-09-12 NOTE — PROGRESS NOTES
Assessment & Plan     Anemia, unspecified type  Chronic anemia since 2017 but stable.  History of negative Hemoccult test in the past.  He did see hematology who felt it was related to his alcohol intake and his nutritional.  We will repeat hemoglobin levels today.  He had Cologuard testing done in July which was negative.  If anemia persist off alcohol, we could consider doing endoscopy and colonoscopy  - CBC with platelets and differential  - Vitamin B1 whole blood  - Folate    Reactive depression  Since his apartment management hired a 24-hour security situation at his living conditions have improved.  He feels much more secure in his apartment building and his mood is better.  Discussed to stay off all alcohol.    Insomnia, unspecified type  We will try trazodone, also recommended evaluation of his ENT for difficulty breathing through his nose.  - traZODone (DESYREL) 50 MG tablet; 1-2 tabs at bed time as neded    Deviated nasal septum  - Adult ENT  Referral; Future    Nasal congestion  We will try Flonase  - Adult ENT  Referral; Future  - fluticasone (FLONASE) 50 MCG/ACT nasal spray; Spray 1 spray into both nostrils daily    Vision changes  History of previous eye surgeries for his ongoing radiation deficit.  - Adult Eye  Referral; Future    Hyponatremia  Discussed to stop all alcohol, instead of water he has been drinking Gatorade.  Blood pressure is normal.  - Basic metabolic panel  (Ca, Cl, CO2, Creat, Gluc, K, Na, BUN)  - TSH with free T4 reflex    Benign non-nodular prostatic hyperplasia with lower urinary tract symptoms  Refill provided  - tamsulosin (FLOMAX) 0.4 MG capsule; TAKE 1 CAPSULE DAILY AFTER SUPPER (DUE FOR OFFICE VISIT, CONTACT CLINIC FOR APPOINTMENT)    Pure hypercholesterolemia  Refill provided  - atorvastatin (LIPITOR) 20 MG tablet; TAKE 1 TABLET DAILY (DUE FOR OFFICE VISIT, CONTACT CLINIC FOR APPOINTMENT)     BMI:   Estimated body mass index is 28.87 kg/m  as  "calculated from the following:    Height as of this encounter: 1.651 m (5' 5\").    Weight as of this encounter: 78.7 kg (173 lb 8 oz).       Return in about 4 months (around 1/12/2023).    Dona Sosa MD  [facility]    Subjective   Frank is a 72 year old accompanied by his ALONE, presenting for the following health issues:  No chief complaint on file.      HPI   PT IS HERE FOR 2 MONTH FOLLOW UP VISIT.    Frank is a 72 year old male with h/o hyperlipidemia, low back pain, BPH, history of transiently elevated PSA and moderate alcohol intake, history of shingles, decreased vision.    Is currently here for follow-up.    I last saw him 2 months ago.  At that time he had more symptoms of depression and anxiety and we started him on Lexapro.  He reports that he took it for months and it did not make a big difference and then she stopped it.  Since that time his situation has greatly improved: The apartment where he is leaving the management hired security and he feels much more secure where she lives.  His anxiety is much better.    Also saw hematologist due to ongoing anemia.  They mention that he should stop all alcohol.  Currently patient reports that he does not drink much but when questioned further it appears that he drinks episodically with a friend over the weekend and can drink 2 bottles of wine himself in 1 setting.  Decrease that he should cut out all alcohol altogether.    She would like to have as sleeping medication.  Often he wakes up during the night due to noises outside of his apartment.  He does not have a lot of problems falling asleep.  If he sleeps on his back he snores but he usually sleeps on his side.  He also has problems breathing through his nose and wonders if he has deviated septum.    He is trying to lose weight.    He does have visual deficits.  He reports that he had cataract surgery in 1 eye which has not improved his vision and retinal detachment surgery " "in the other eye which also did not improve his vision.  He does not drive because of it.    Review of Systems   As above      Objective    /76 (BP Location: Left arm, Patient Position: Sitting, Cuff Size: Adult Regular)   Pulse 73   Temp 97.7  F (36.5  C) (Tympanic)   Resp 16   Ht 1.651 m (5' 5\")   Wt 78.7 kg (173 lb 8 oz)   SpO2 98%   BMI 28.87 kg/m    Body mass index is 28.87 kg/m .  Physical Exam   General: well appearing male, alert and oriented x3  EYES: Eyelids, conjunctiva, and sclera were normal. Pupils were normal.   HEAD, EARS, NOSE, MOUTH, AND THROAT: no cervical LAD, no thyromegaly or nodules appreciated. TMs are visualized and normal, oropharynx is clear.  Very narrow nasal passages, hard to tell if he has septal deviation.  He does have mild nasal congestion.  RESPIRATORY: respirations non labored, CTA bl, no wheezes, rales, no forced expiratory wheezing.  CARDIOVASCULAR: Heart rate and rhythm were normal. No murmurs, rubs,gallops. There was no peripheral edema.   GASTROINTESTINAL: Positive bowel sounds, abdomen is soft, non tender, non distended.     MUSCULOSKELETAL: Muscle mass was normal for age. No joint synovitis or deformity.  LYMPHATIC: There were no enlarged nodes palpable.  SKIN/HAIR/NAILS: Skin color was normal.  No rashes.  NEUROLOGIC: The patient was alert and oriented.  Speech was normal.  There is no facial asymmetry.   PSYCHIATRIC:  Mood and affect were normal.           "

## 2022-09-12 NOTE — PATIENT INSTRUCTIONS
Stop all alcohol for now, since it can be causing anemia.    2. Labs today    3. Get Flu shot and new Covid booster    4. See ENT for problems breathing through your nose    5. See eye doctor to check on eye again.

## 2022-09-15 LAB — VIT B1 PYROPHOSHATE BLD-SCNC: 108 NMOL/L

## 2022-09-17 DIAGNOSIS — N40.1 BENIGN NON-NODULAR PROSTATIC HYPERPLASIA WITH LOWER URINARY TRACT SYMPTOMS: ICD-10-CM

## 2022-09-17 DIAGNOSIS — E78.00 PURE HYPERCHOLESTEROLEMIA: ICD-10-CM

## 2022-09-19 RX ORDER — TAMSULOSIN HYDROCHLORIDE 0.4 MG/1
CAPSULE ORAL
Qty: 90 CAPSULE | Refills: 1 | Status: SHIPPED | OUTPATIENT
Start: 2022-09-19 | End: 2023-10-16

## 2022-09-19 RX ORDER — ATORVASTATIN CALCIUM 20 MG/1
TABLET, FILM COATED ORAL
Qty: 90 TABLET | Refills: 1 | Status: SHIPPED | OUTPATIENT
Start: 2022-09-19 | End: 2024-05-06

## 2022-09-20 DIAGNOSIS — H43.393 VITREOUS SYNERESIS OF BOTH EYES: ICD-10-CM

## 2022-09-20 DIAGNOSIS — H54.60 UNQUALIFIED VISUAL LOSS, ONE EYE: Primary | ICD-10-CM

## 2022-09-21 ENCOUNTER — TELEPHONE (OUTPATIENT)
Dept: INTERNAL MEDICINE | Facility: CLINIC | Age: 72
End: 2022-09-21

## 2022-09-21 NOTE — TELEPHONE ENCOUNTER
Please let pt know lab results:    Sodium level is much better.  Thyroid function is normal.  Anemia is improving.  Folic acid levels and B1 vitamins are normal.  Continue off all alcohol.    Dr CORREA

## 2022-09-27 NOTE — ADDENDUM NOTE
Encounter addended by: Caron Hogue, PT on: 9/27/2022 11:09 AM   Actions taken: Episode resolved, Clinical Note Signed

## 2022-09-27 NOTE — PROGRESS NOTES
[facility]    Outpatient Physical Therapy Discharge Note  Patient: Frank Sharp  : 1950    Beginning/End Dates of Reporting Period:  22 to 22    Referring Provider: Dona Sosa MD    Therapy Diagnosis: chronic right shoulder pain     Goals:  Goal Identifier     Goal Description     Target Date     Date Met      Progress (detail required for progress note):       Plan:  Discharge from therapy.    Discharge:    Reason for Discharge: Patient has failed to schedule further appointments.    Discharge Plan: discharge, continue with HEP provided at evaluation.

## 2022-09-28 ENCOUNTER — OFFICE VISIT (OUTPATIENT)
Dept: OPHTHALMOLOGY | Facility: CLINIC | Age: 72
End: 2022-09-28
Attending: INTERNAL MEDICINE
Payer: COMMERCIAL

## 2022-09-28 DIAGNOSIS — H54.60 UNQUALIFIED VISUAL LOSS, ONE EYE: ICD-10-CM

## 2022-09-28 DIAGNOSIS — H35.412 LATTICE DEGENERATION OF LEFT RETINA: ICD-10-CM

## 2022-09-28 DIAGNOSIS — H33.20 RETINAL DETACHMENT, UNSPECIFIED LATERALITY: ICD-10-CM

## 2022-09-28 DIAGNOSIS — H53.9 VISION CHANGES: ICD-10-CM

## 2022-09-28 DIAGNOSIS — H43.393 VITREOUS SYNERESIS OF BOTH EYES: Primary | ICD-10-CM

## 2022-09-28 DIAGNOSIS — H35.372 EPIRETINAL MEMBRANE (ERM) OF LEFT EYE: ICD-10-CM

## 2022-09-28 DIAGNOSIS — H50.21 HYPERTROPIA OF RIGHT EYE: ICD-10-CM

## 2022-09-28 PROCEDURE — 92060 SENSORIMOTOR EXAMINATION: CPT | Mod: 26 | Performed by: OPHTHALMOLOGY

## 2022-09-28 PROCEDURE — 92060 SENSORIMOTOR EXAMINATION: CPT | Performed by: OPHTHALMOLOGY

## 2022-09-28 PROCEDURE — G0463 HOSPITAL OUTPT CLINIC VISIT: HCPCS | Mod: 25

## 2022-09-28 PROCEDURE — 92004 COMPRE OPH EXAM NEW PT 1/>: CPT | Mod: GC | Performed by: OPHTHALMOLOGY

## 2022-09-28 PROCEDURE — 92250 FUNDUS PHOTOGRAPHY W/I&R: CPT | Performed by: OPHTHALMOLOGY

## 2022-09-28 PROCEDURE — 92134 CPTRZ OPH DX IMG PST SGM RTA: CPT | Performed by: OPHTHALMOLOGY

## 2022-09-28 ASSESSMENT — VISUAL ACUITY
CORRECTION_TYPE: GLASSES
OD_CC: 20/60
OS_CC: 20/30
METHOD: SNELLEN - LINEAR
OD_CC+: -1

## 2022-09-28 ASSESSMENT — EXTERNAL EXAM - RIGHT EYE: OD_EXAM: NORMAL

## 2022-09-28 ASSESSMENT — CUP TO DISC RATIO
OS_RATIO: 0.4
OD_RATIO: 0.4

## 2022-09-28 ASSESSMENT — REFRACTION_WEARINGRX
OS_ADD: +3.00
OS_SPHERE: +0.00
OD_CYLINDER: +0.75
OS_CYLINDER: +0.50
OD_ADD: +3.00
OS_AXIS: 072
SPECS_TYPE: BIFOCAL
OD_SPHERE: -1.25
OD_AXIS: 140

## 2022-09-28 ASSESSMENT — CONF VISUAL FIELD
OS_NORMAL: 1
OD_NORMAL: 1

## 2022-09-28 ASSESSMENT — SLIT LAMP EXAM - LIDS
COMMENTS: NORMAL
COMMENTS: NORMAL

## 2022-09-28 ASSESSMENT — EXTERNAL EXAM - LEFT EYE: OS_EXAM: NORMAL

## 2022-09-28 ASSESSMENT — TONOMETRY
OD_IOP_MMHG: 10
OS_IOP_MMHG: 11
IOP_METHOD: TONOPEN

## 2022-09-28 NOTE — PROGRESS NOTES
CC -   H/o RD right eye, referred by PCP    INTERVAL HISTORY - Initial visit    HPI -   Frank Sharp is a  72 year old year-old patient presenting for retinal exam.     HLD (statin)     PAST OCULAR SURGERY  PCIOL each eye   RD repair right eye 2015      RETINAL IMAGING:  OCT 09/28/22   OD - EZ disruption subfoveally, no SRF/IRF; fine ERM  OS - ERM     Optos 09/28/22   right eye: retinotomy scars  left eye: normal       ASSESSMENT & PLAN    # Comitant right hypertropia  # monocular diplopia component   - ? Post surgical   - Patient notes monocular diplopia when right eye only is open  - also has hypertropia on sensorineural exam  - rep[eat orthoptist deepa; for fresnel press-on prism  - refer to neuro for further evaluation  - ATs for dry cornea     # Lattice left eye  - Discussed RT/RD precautions    # H/o RD right eye  - s/p repair PPV 2015: looking good    # PCIOL each eye   - 1+ pco left eye  - continue to monitor    # ERM each eye   - left eye > right eye   - monitor, discussed risk of metamorphopsia     #Refractive error  - Return for undilated MRx check     return to clinic: 8-9 months VTD Mac OCT    Tavo Lerma MD  Resident Physician, PGY-3  Department of Ophthalmology  09/28/22 9:18 AM      Complete documentation of historical and exam elements from today's encounter can be found in the full encounter summary report (not reduplicated in this progress note). I personally obtained the chief complaint(s) and history of present illness.  I confirmed and edited as necessary the review of systems, past medical/surgical history, family history, social history, and examination findings as documented by others; and I examined the patient myself. I personally reviewed the relevant tests, images, and reports as documented above. I formulated and edited as necessary the assessment and plan and discussed the findings and management plan with the patient and family.     Turner Rudd MD, PhD

## 2022-09-28 NOTE — NURSING NOTE
Chief Complaints and History of Present Illnesses   Patient presents with     Retinal Evaluation     Chief Complaint(s) and History of Present Illness(es)     Retinal Evaluation     Laterality: right eye    Onset: 1 year ago              Comments     Pt. States that he has been having issues with seeing double since having RD repair RE in 2016. Was told that there was nothing that could be done to correct this. No change in VA BE. No pain BE. Has had FB sensation RE for the last week.   Pilar Galvez COT 7:52 AM September 28, 2022

## 2022-10-05 ENCOUNTER — OFFICE VISIT (OUTPATIENT)
Dept: OTOLARYNGOLOGY | Facility: CLINIC | Age: 72
End: 2022-10-05
Attending: INTERNAL MEDICINE
Payer: COMMERCIAL

## 2022-10-05 DIAGNOSIS — H90.3 BILATERAL SENSORINEURAL HEARING LOSS: Primary | ICD-10-CM

## 2022-10-05 DIAGNOSIS — R09.81 NASAL CONGESTION: ICD-10-CM

## 2022-10-05 DIAGNOSIS — J34.2 DEVIATED NASAL SEPTUM: ICD-10-CM

## 2022-10-05 PROCEDURE — 99204 OFFICE O/P NEW MOD 45 MIN: CPT | Performed by: OTOLARYNGOLOGY

## 2022-10-05 RX ORDER — AZELASTINE 1 MG/ML
SPRAY, METERED NASAL
Qty: 30 ML | Refills: 11 | Status: SHIPPED | OUTPATIENT
Start: 2022-10-05
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
## 2022-10-07 ENCOUNTER — OFFICE VISIT (OUTPATIENT)
Dept: OPHTHALMOLOGY | Facility: CLINIC | Age: 72
End: 2022-10-07
Attending: STUDENT IN AN ORGANIZED HEALTH CARE EDUCATION/TRAINING PROGRAM
Payer: COMMERCIAL

## 2022-10-07 DIAGNOSIS — H50.21 HYPERTROPIA OF RIGHT EYE: Primary | ICD-10-CM

## 2022-10-07 PROCEDURE — 999N000103 HC STATISTIC NO CHARGE FACILITY FEE

## 2022-10-07 PROCEDURE — V2718 FRESNELL PRISM PRESS-ON LENS: HCPCS

## 2022-10-07 PROCEDURE — 99207 PR NO CHARGE COORDINATED CARE PS: CPT

## 2022-10-07 ASSESSMENT — VISUAL ACUITY
OS_CC: 20/20
OD_CC: 20/70
CORRECTION_TYPE: GLASSES
METHOD: SNELLEN - LINEAR
OS_CC+: -1
OD_CC+: -2

## 2022-10-07 NOTE — PATIENT INSTRUCTIONS
Fresnel prisms  You have been prescribed a temporary prism known as a Fresnel prism.  The prism will help to give you binocular single vision in a straight ahead position.  However, you may still notice some residual double vision in other gazes, try moving your head to look in different directions instead of your eyes to maintain binocularity.      The prism can produce some blurry vision or distortion.  You may also notice a rainbow streak of light when looking at certain lights.  These are all normal and expected.  The prism can be applied and removed without damaging the lens of your glasses and can be changed whenever necessary to adjust to your needs.      Fresnel prism strength:  A prism of   4  base down strength has been applied to the top portion of the right side lens of your glasses.    Daily Care Instructions  It is not necessary to remove the prism for daily cleaning.  A gentle wiping with a soft cloth will remove particles caught in the prism ridges.  However, if you work in a niurka or dirty environment it will occasionally be necessary to thoroughly clean the prism.    1.  Remove the prism from the lens by lifting up an edge.    2.  Wash the prism with warm water or use a non-alcohol cleaning solution made for glasses or contact lenses.  Avoid daily use of soap or detergent as this will cause the prism to yellow and stiffen over time.  3.  Clean the lens of your glasses as you normally would.  4.  To replace the prism, apply a small dot of water on the lens.  Place the smooth side of the prism over the lens.  Gently press the prism down against the lens to squeeze out any remaining water or air bubble out through the edges.   *Be sure to replace the prism on the same lens that you removed it from.  Look for any marks (if applicable) made to indicate what position it   should be in.*  5.  Dry the glasses and prism completely.  The prism can be moved on the lens while wet if adjustment is necessary but  it will adhere firmly to the lens as it dries in about an hour.      If the prism becomes discolored or will no longer adhere, contact us at:  [facility]  Department of Neuro-Ophthalmology   [phone]              7/2022.prismcare/ny

## 2022-10-07 NOTE — PROGRESS NOTES
"Assessment & Plan       Frank Sharp is a 72 year old male with the following diagnoses:   1. Hypertropia of right eye       Chief Complaint(s) and History of Present Illness(es)     Diplopia Follow-Up     In right eye. Additional comments: 1. Diplopia, monocular in the right eye  Patient reports monocular diplopia with right eye only.  Images seem to jump around when the left eye is closed.  Vision is stable.  Has newer pair of glasses but have not been able to adjust to the new prescription.  Glasses today are from an older Rx.  Sent to orthoptic clinic for a Fresnel prism evaluation.  LYNN Munguia 10/7/2022 2:09 PM              A Fresnel prism of 4pd was applied over the top portion of the right lens.  Although this expectedly did not completely resolve his complaints of monocular diplopia in the right eye, patient thought it did take care of \"some\" double vision.  He opted to try it for the time being and see if it would help.  I advised him to give the Fresnel prism a few weeks to see if it would buy some more binocularity despite his ERM in both eyes.  He was fine with these instructions.  I educated him on the difficulty of being able to resolve his monocular double vision but we both agreed he could give the Fresnel a try anyway.  Pt was encouraged to call with further questions and comments.  I suspect this will not completely resolve his double vision.               Patient disposition:   Follow up as needed in Orthoptic clinic.    Has appt to see Dr. Rudd 06/06/23.       LYNN Munguia 10/7/2022 2:12 PM    "

## 2022-10-21 ENCOUNTER — HOSPITAL ENCOUNTER (OUTPATIENT)
Dept: CT IMAGING | Facility: CLINIC | Age: 72
Discharge: HOME | End: 2022-10-21
Attending: OTOLARYNGOLOGY | Admitting: OTOLARYNGOLOGY
Payer: COMMERCIAL

## 2022-10-21 ENCOUNTER — TRANSFERRED RECORDS (OUTPATIENT)
Dept: HEALTH INFORMATION MANAGEMENT | Facility: CLINIC | Age: 72
End: 2022-10-21

## 2022-10-21 DIAGNOSIS — J34.2 DEVIATED NASAL SEPTUM: ICD-10-CM

## 2022-10-21 DIAGNOSIS — R09.81 NASAL CONGESTION: ICD-10-CM

## 2022-10-21 PROCEDURE — 70486 CT MAXILLOFACIAL W/O DYE: CPT

## 2022-10-26 ENCOUNTER — TELEPHONE (OUTPATIENT)
Dept: OTOLARYNGOLOGY | Facility: CLINIC | Age: 72
End: 2022-10-26

## 2022-10-26 NOTE — TELEPHONE ENCOUNTER
Spoke with Frank and gave results listed below per Dr. Martin.  Pt expressed understanding of results.    [facility]      No Barker RN  [facility]  ENT  [facility] [address]  [email or web address]   Office:[phone]  Employed by [facility] [employer] [employer]

## 2022-10-26 NOTE — TELEPHONE ENCOUNTER
----- Message from Harshad Martin MD sent at 10/26/2022  7:36 AM CDT -----  Please advise patients sinus passageways are clear.  There is a deviated septum present.  I can discuss with him  at his follow up appointment.

## 2022-11-04 DIAGNOSIS — R09.81 NASAL CONGESTION: ICD-10-CM

## 2022-11-05 RX ORDER — FLUTICASONE PROPIONATE 50 MCG
SPRAY, SUSPENSION (ML) NASAL
Qty: 16 ML | Refills: 11 | OUTPATIENT
Start: 2022-11-05

## 2023-01-26 ENCOUNTER — PATIENT OUTREACH (OUTPATIENT)
Dept: GERIATRIC MEDICINE | Facility: CLINIC | Age: 73
End: 2023-01-26
Payer: COMMERCIAL

## 2023-01-26 NOTE — PROGRESS NOTES
[facility] Six-Month Telephone Assessment    6 month telephone assessment completed on 1/25/23.    ER visits: No  Hospitalizations: No  TCU stays: No  Significant health status changes: None reported.   Falls/Injuries: No  ADL/IADL changes: No  Changes in services: No    Caregiver Assessment follow up:  N/A    Goals: See POC in chart for goal progress documentation.      Frank agreed to a home visit to complete the annual HRA on 1/31/23 at 1 PM.    Encouraged member to call CC with any questions or concerns in the meantime.       ELADIA Morrison  [facility]  [phone]

## 2023-01-27 ENCOUNTER — PATIENT OUTREACH (OUTPATIENT)
Dept: GERIATRIC MEDICINE | Facility: CLINIC | Age: 73
End: 2023-01-27
Payer: COMMERCIAL

## 2023-01-28 NOTE — PROGRESS NOTES
Encounter opened due to Regulatory [employer] Update to close FVP Program.    Rosio Marinelli  [facility]  Case Management Specialist  [phone]

## 2023-01-28 NOTE — PROGRESS NOTES
Encounter opened due to Regulatory [employer] Update to open FVP Program.    Rosio Marinelli  [facility]  Case Management Specialist  [phone]

## 2023-01-31 ENCOUNTER — PATIENT OUTREACH (OUTPATIENT)
Dept: GERIATRIC MEDICINE | Facility: CLINIC | Age: 73
End: 2023-01-31
Payer: COMMERCIAL

## 2023-01-31 NOTE — LETTER
January 31, 2023    AAKASH WARREN  [address]   [place] [address]        Dear Aakash:    As a member of [facility], we offer a health risk assessment at no cost to you. I know you don't want to have the assessment right now. If you change your mind, please call me at the number below.    Who performs the health risk assessment?  A [facility] Care Coordinator performs the assessment. Our Care Coordinators can also help you understand your benefits. They can tell you about services to aid you at home, such as managing your care with your doctors if your health worsens.    Our Care Coordinators are here for you if you need:    Support for activities you used to do by yourself (including making meals, bathing, and paying bills)    Equipment for bathroom or home safety    Help finding a new place to live    Information on staying healthy, preventing falls and immunizations    Questions?  If you have questions, or you would like to do the assessment, call me at [phone]. TTY users call [phone]. I'm here from 8am to 5pm. I may reach out to you again soon.      Sincerely,        ELADIA Morrison  [phone]  [email or web address]      <[id number] accepted    [id number] (12/2021)  [id number]>

## 2023-02-10 ENCOUNTER — NURSE TRIAGE (OUTPATIENT)
Dept: INTERNAL MEDICINE | Facility: CLINIC | Age: 73
End: 2023-02-10

## 2023-02-10 ENCOUNTER — ANCILLARY PROCEDURE (OUTPATIENT)
Dept: GENERAL RADIOLOGY | Facility: CLINIC | Age: 73
End: 2023-02-10
Attending: INTERNAL MEDICINE
Payer: COMMERCIAL

## 2023-02-10 ENCOUNTER — OFFICE VISIT (OUTPATIENT)
Dept: INTERNAL MEDICINE | Facility: CLINIC | Age: 73
End: 2023-02-10
Payer: COMMERCIAL

## 2023-02-10 VITALS
RESPIRATION RATE: 14 BRPM | BODY MASS INDEX: 29.12 KG/M2 | SYSTOLIC BLOOD PRESSURE: 104 MMHG | WEIGHT: 175 LBS | TEMPERATURE: 96.6 F | DIASTOLIC BLOOD PRESSURE: 52 MMHG | HEART RATE: 83 BPM | OXYGEN SATURATION: 96 %

## 2023-02-10 DIAGNOSIS — D64.9 ANEMIA, UNSPECIFIED TYPE: ICD-10-CM

## 2023-02-10 DIAGNOSIS — R05.1 ACUTE COUGH: ICD-10-CM

## 2023-02-10 DIAGNOSIS — R05.1 ACUTE COUGH: Primary | ICD-10-CM

## 2023-02-10 DIAGNOSIS — R09.81 NASAL CONGESTION: ICD-10-CM

## 2023-02-10 DIAGNOSIS — E87.1 LOW SODIUM LEVELS: ICD-10-CM

## 2023-02-10 DIAGNOSIS — E83.52 SERUM CALCIUM ELEVATED: ICD-10-CM

## 2023-02-10 DIAGNOSIS — F10.11 ALCOHOL ABUSE, IN REMISSION: ICD-10-CM

## 2023-02-10 DIAGNOSIS — R06.83 SNORING: ICD-10-CM

## 2023-02-10 LAB
ANION GAP SERPL CALCULATED.3IONS-SCNC: 14 MMOL/L (ref 7–15)
BASOPHILS # BLD AUTO: 0 10E3/UL (ref 0–0.2)
BASOPHILS NFR BLD AUTO: 1 %
BUN SERPL-MCNC: 29.3 MG/DL (ref 8–23)
CALCIUM SERPL-MCNC: 11.2 MG/DL (ref 8.8–10.2)
CHLORIDE SERPL-SCNC: 104 MMOL/L (ref 98–107)
CREAT SERPL-MCNC: 1.05 MG/DL (ref 0.67–1.17)
DEPRECATED HCO3 PLAS-SCNC: 22 MMOL/L (ref 22–29)
EOSINOPHIL # BLD AUTO: 0.1 10E3/UL (ref 0–0.7)
EOSINOPHIL NFR BLD AUTO: 2 %
ERYTHROCYTE [DISTWIDTH] IN BLOOD BY AUTOMATED COUNT: 12.6 % (ref 10–15)
GFR SERPL CREATININE-BSD FRML MDRD: 76 ML/MIN/1.73M2
GLUCOSE SERPL-MCNC: 102 MG/DL (ref 70–99)
HCT VFR BLD AUTO: 37.1 % (ref 40–53)
HGB BLD-MCNC: 12.8 G/DL (ref 13.3–17.7)
IMM GRANULOCYTES # BLD: 0 10E3/UL
IMM GRANULOCYTES NFR BLD: 0 %
IRON BINDING CAPACITY (ROCHE): 269 UG/DL (ref 240–430)
IRON SATN MFR SERPL: 48 % (ref 15–46)
IRON SERPL-MCNC: 129 UG/DL (ref 61–157)
LYMPHOCYTES # BLD AUTO: 1.7 10E3/UL (ref 0.8–5.3)
LYMPHOCYTES NFR BLD AUTO: 42 %
MCH RBC QN AUTO: 32.2 PG (ref 26.5–33)
MCHC RBC AUTO-ENTMCNC: 34.5 G/DL (ref 31.5–36.5)
MCV RBC AUTO: 93 FL (ref 78–100)
MONOCYTES # BLD AUTO: 0.4 10E3/UL (ref 0–1.3)
MONOCYTES NFR BLD AUTO: 9 %
NEUTROPHILS # BLD AUTO: 1.9 10E3/UL (ref 1.6–8.3)
NEUTROPHILS NFR BLD AUTO: 47 %
PLATELET # BLD AUTO: 216 10E3/UL (ref 150–450)
POTASSIUM SERPL-SCNC: 3.8 MMOL/L (ref 3.4–5.3)
RBC # BLD AUTO: 3.98 10E6/UL (ref 4.4–5.9)
SODIUM SERPL-SCNC: 140 MMOL/L (ref 136–145)
WBC # BLD AUTO: 4 10E3/UL (ref 4–11)

## 2023-02-10 PROCEDURE — 83540 ASSAY OF IRON: CPT | Performed by: INTERNAL MEDICINE

## 2023-02-10 PROCEDURE — 99214 OFFICE O/P EST MOD 30 MIN: CPT | Performed by: INTERNAL MEDICINE

## 2023-02-10 PROCEDURE — 82306 VITAMIN D 25 HYDROXY: CPT | Performed by: INTERNAL MEDICINE

## 2023-02-10 PROCEDURE — 85025 COMPLETE CBC W/AUTO DIFF WBC: CPT | Performed by: INTERNAL MEDICINE

## 2023-02-10 PROCEDURE — 83550 IRON BINDING TEST: CPT | Performed by: INTERNAL MEDICINE

## 2023-02-10 PROCEDURE — 36415 COLL VENOUS BLD VENIPUNCTURE: CPT | Performed by: INTERNAL MEDICINE

## 2023-02-10 PROCEDURE — 71046 X-RAY EXAM CHEST 2 VIEWS: CPT | Mod: TC | Performed by: RADIOLOGY

## 2023-02-10 PROCEDURE — 80048 BASIC METABOLIC PNL TOTAL CA: CPT | Performed by: INTERNAL MEDICINE

## 2023-02-10 RX ORDER — METHYLPREDNISOLONE 4 MG
TABLET, DOSE PACK ORAL
Qty: 21 TABLET | Refills: 0 | Status: SHIPPED | OUTPATIENT
Start: 2023-02-10 | End: 2024-05-06

## 2023-02-10 RX ORDER — AZITHROMYCIN 250 MG/1
TABLET, FILM COATED ORAL
Qty: 6 TABLET | Refills: 0 | Status: SHIPPED | OUTPATIENT
Start: 2023-02-10 | End: 2023-02-15

## 2023-02-10 RX ORDER — FLUTICASONE PROPIONATE 50 MCG
1 SPRAY, SUSPENSION (ML) NASAL DAILY
Qty: 16 G | Refills: 11 | Status: SHIPPED | OUTPATIENT
Start: 2023-02-10 | End: 2024-05-06

## 2023-02-10 ASSESSMENT — ENCOUNTER SYMPTOMS: COUGH: 1

## 2023-02-10 NOTE — PATIENT INSTRUCTIONS
Cough is most likely due to sinus congestion, airway irritation. Take Medrol dose pack and a z-pack. Will check chest XR today.    Use Flonase nasal spray nightly on chronic bases.     3. Follow up with ENT for nasal septum deviation and treatment and basal polyp.     2. Will repeat labs today.    3. See sleep doctor to evaluate for sleep apnea.

## 2023-02-10 NOTE — TELEPHONE ENCOUNTER
"Nurse Triage SBAR    Is this a 2nd Level Triage? NO    Situation: 1. ONSET:       1/29/23 - Sunday   2. SEVERITY:        Cough is not as bad today as pt took cough medication today.   3. SPUTUM:       Clear, sometimes green  4. HEMOPTYSIS:       Denies  5. DIFFICULTY BREATHING:      Pt is able to breathing, pt feels congested when going to bed.   6. FEVER:     No fever. Pt did feel warm but he took \"cold/flu\" pills and felt better  7. CARDIAC HISTORY: denies  8. LUNG HISTORY: denies  9. PE RISK FACTORS: denies  10. OTHER SYMPTOMS: runny nose, chest pain from coughing  12. TRAVEL:       Denies    Background: Pt does not have documented lung conditions.     Assessment: Pt sounded very congested over the phone. Pt denied sever shortness of breath, was still able to speak in sentences.     Protocol Recommended Disposition:   Go To Office Now    Recommendation: Pt scheduled in acute visit spot, pt asked to complete at home covid test and call the clinic if positive.       Routed to provider        Reason for Disposition    MILD difficulty breathing (e.g., minimal/no SOB at rest, SOB with walking, pulse <100) and still present when not coughing    Additional Information    Negative: Bluish (or gray) lips or face    Negative: SEVERE difficulty breathing (e.g., struggling for each breath, speaks in single words)    Negative: Rapid onset of cough and has hives    Negative: Coughing started suddenly after medicine, an allergic food or bee sting    Negative: Difficulty breathing after exposure to flames, smoke, or fumes    Negative: Sounds like a life-threatening emergency to the triager    Negative: Previous asthma attacks and this feels like asthma attack    Negative: Dry cough (non-productive; no sputum or minimal clear sputum) and within 14 days of COVID-19 Exposure    Negative: MODERATE difficulty breathing (e.g., speaks in phrases, SOB even at rest, pulse 100-120) and still present when not coughing    Negative: Chest " "pain present when not coughing    Negative: Passed out (i.e., fainted, collapsed and was not responding)    Negative: Patient sounds very sick or weak to the triager    Answer Assessment - Initial Assessment Questions  1. ONSET:       1/29/23 - Sunday   2. SEVERITY:        Cough is not as bad today as pt took cough medication today.   3. SPUTUM:       Clear, sometimes green  4. HEMOPTYSIS:       Denies  5. DIFFICULTY BREATHING:      Pt is able to breathing, pt feels congested when going to bed.   6. FEVER:     No fever. Pt did feel warm but he took \"cold/flu\" pills and felt better  7. CARDIAC HISTORY: denies  8. LUNG HISTORY: denies  9. PE RISK FACTORS: denies  10. OTHER SYMPTOMS: runny nose, chest pain from coughing  12. TRAVEL:       Denies    Protocols used: COUGH-A-OH      "

## 2023-02-10 NOTE — PROGRESS NOTES
"  Assessment & Plan     Acute cough  Suspect due to postnasal drainage and mild bronchitis.  We will use Medrol Dosepak and Z-Xavier, will check a chest x-ray.  - methylPREDNISolone (MEDROL DOSEPAK) 4 MG tablet therapy pack; Follow Package Directions  - XR Chest 2 Views; Future  - azithromycin (ZITHROMAX) 250 MG tablet; Take 2 tablets (500 mg) by mouth daily for 1 day, THEN 1 tablet (250 mg) daily for 4 days.    Nasal congestion  Discussed to use Flonase long-term before bedtime  - fluticasone (FLONASE) 50 MCG/ACT nasal spray; Spray 1 spray into both nostrils daily  - methylPREDNISolone (MEDROL DOSEPAK) 4 MG tablet therapy pack; Follow Package Directions    Anemia, unspecified type  Was seen by hematology in the past and it was felt to be due to alcohol abuse.  He has not been drinking since September.  Previously has had negative Cologuard.  If anemia persist, would consider sending him for endoscopy and colonoscopy.  - CBC with platelets and differential  - Iron and iron binding capacity    Low sodium levels  Should be better since he quit alcohol  - Basic metabolic panel  (Ca, Cl, CO2, Creat, Gluc, K, Na, BUN)    Snoring  He was seen by ENT and had CT of the sinus done which showed septal deviation.  He will follow-up with them in March.  May need surgery to correct that.  If he continues to have apneic episodes at night, would recommend sleep apnea evaluation at sleep clinic next.  - Adult Sleep Eval & Management  Referral; Future    Alcohol abuse, in remission  Has been off alcohol since September.  Mood is better since he moved to a new apartment.       BMI:   Estimated body mass index is 29.12 kg/m  as calculated from the following:    Height as of 9/12/22: 1.651 m (5' 5\").    Weight as of this encounter: 79.4 kg (175 lb).     Dona Sosa MD  [facility]    Hoang Marie is a 72 year old, presenting for the following health issues:  Cough (Almost 2 weeks, neg home " VENKATA payneJanis Marie is a 72 year old male with h/o hyperlipidemia, low back pain, BPH, history of transiently elevated PSA and moderate alcohol intake, history of shingles, decreased vision.    Is currently here for acute visit due to cough for the last 2 weeks.    Symptoms started on January 29.  He developed some postnasal drainage and cough.  He felt increasing congestion in his sinus and chest.  Denies any fevers or chills.  No sinus ear or jaw pain.  He coughs up mucus that that is white.  He denies any history of asthma and has had distant very mild history of smoking.  Cough has been bothering him at nighttime.  He denies any gastrointestinal symptoms.  No sick exposures but he does take public transportation daily.  Denies any pleurisy.  But when he coughs his back hurts sometimes on the left side.  Since I last saw him several months ago he has not touched alcohol.    Cough    History of Present Illness       Reason for visit:  Cough and possible pneumonia    He eats 0-1 servings of fruits and vegetables daily.He consumes 0 sweetened beverage(s) daily.He exercises with enough effort to increase his heart rate 30 to 60 minutes per day.  He exercises with enough effort to increase his heart rate 4 days per week.   He is taking medications regularly.       Review of Systems   Respiratory: Positive for cough.           Objective    /52   Pulse 83   Temp (!) 96.6  F (35.9  C) (Tympanic)   Resp 14   Wt 79.4 kg (175 lb)   SpO2 96%   BMI 29.12 kg/m    Body mass index is 29.12 kg/m .  Physical Exam   General: well appearing male, alert and oriented x3  EYES: Eyelids, conjunctiva, and sclera were normal. Pupils were normal.   HEAD, EARS, NOSE, MOUTH, AND THROAT: no cervical LAD, no thyromegaly or nodules appreciated. TMs are visualized and normal, oropharynx is clear.  He does have crowded oropharynx, moderate nasal congestion when he presents with his nose, no sinus tenderness to  palpation.  RESPIRATORY: respirations non labored, CTA bl, no wheezes, rales, no forced expiratory wheezing.  Cough is dry.  CARDIOVASCULAR: Heart rate and rhythm were normal. No murmurs, rubs,gallops. There was no peripheral edema.   GASTROINTESTINAL: Positive bowel sounds, abdomen is soft, non tender, non distended.     MUSCULOSKELETAL: Muscle mass was normal for age. No joint synovitis or deformity.  LYMPHATIC: There were no enlarged nodes palpable.  SKIN/HAIR/NAILS: Skin color was normal.  No rashes.  NEUROLOGIC: The patient was alert and oriented.  Speech was normal.  There is no facial asymmetry.   PSYCHIATRIC:  Mood and affect were normal.

## 2023-02-13 LAB — DEPRECATED CALCIDIOL+CALCIFEROL SERPL-MC: 35 UG/L (ref 20–75)

## 2023-02-15 ENCOUNTER — TELEPHONE (OUTPATIENT)
Dept: INTERNAL MEDICINE | Facility: CLINIC | Age: 73
End: 2023-02-15
Payer: COMMERCIAL

## 2023-02-15 DIAGNOSIS — E83.52 SERUM CALCIUM ELEVATED: Primary | ICD-10-CM

## 2023-02-15 NOTE — TELEPHONE ENCOUNTER
Please let pt know results:    Chest x-ray is normal.    Kidney function and sodium levels are normal.  Vitamin D level is good.    Hemoglobin level is slightly below normal indicating slight anemia, it has not gotten worse, but has not gotten better either.  Continue off all alcohol.  Since anemia has been persistent, I would like him to have endoscopy and colonoscopy procedures done to look inside of the stomach and colon to make sure there is no lesions that make him lose blood..  Scheduling usually looks procedures for this months in advance.  I can put a referral to [facility] (please give him phone number to set up colonoscopy and endoscopy).  Please route to me for order if he is agreeable with endoscopy and colonoscopy procedures.     Calcium level was elevated.  I would like you to have that test repeated.  If you are taking calcium supplements please stop them and start eating more off calcium-containing foods instead (low-fat yogurt, milk or almond milk).    I put orders in for repeat calcium level and also parathyroid hormone.  Please help patient set up lab appointment.

## 2023-02-17 NOTE — TELEPHONE ENCOUNTER
Spoke with patient and informed him of the test results from Dr. Sosa.     Pt does not want to pursue the colonoscopy/endoscopy at this time but will think about it.     Pt has been scheduled for a lab recheck on Monday 2/20/23.

## 2023-03-01 NOTE — PROGRESS NOTES
Late entry - 1/31/23    [facility] Refusal Telephone Assessment    Member refused home visit HRA on 1/31/23 (reason: sick and not feeling well).    ER visits: No  Hospitalizations: No  Health concerns: None reported.  Falls/Injuries: No  ADL/IADL Dependencies:        Member currently receiving the following home care services:     Member currently receiving the following community resources:    Informal support(s):      Advanced Care Planning discussion, complete code section.    [facility] sponsored benefits: Shared information re: Silver Sneakers/gym memberships, ASA, Calcium +D.    Follow-Up Plan: Member informed of future contact, plan to f/u with member with a 6 month telephone assessment and offer a home visit.  Contact information shared with member and family, encouraged member to call with any questions or concerns at any time.    This CC note routed to PCP.    ELADIA Morrison  [facility]  [phone]

## 2023-03-02 NOTE — PROGRESS NOTES
"[facility] Care Coordination Contact    Per CC, mailed client a \"Refusal of Home Visit\" letter.    Rosio Marinelli  [facility]  Case Management Specialist  [phone]    "

## 2023-05-23 DIAGNOSIS — H35.372 EPIRETINAL MEMBRANE (ERM) OF LEFT EYE: Primary | ICD-10-CM

## 2023-06-13 ENCOUNTER — PATIENT OUTREACH (OUTPATIENT)
Dept: CARE COORDINATION | Facility: CLINIC | Age: 73
End: 2023-06-13
Payer: COMMERCIAL

## 2023-06-27 ENCOUNTER — PATIENT OUTREACH (OUTPATIENT)
Dept: CARE COORDINATION | Facility: CLINIC | Age: 73
End: 2023-06-27
Payer: COMMERCIAL

## 2023-08-21 ENCOUNTER — PATIENT OUTREACH (OUTPATIENT)
Dept: GERIATRIC MEDICINE | Facility: CLINIC | Age: 73
End: 2023-08-21
Payer: COMMERCIAL

## 2023-08-21 NOTE — LETTER
August 29, 2023      AAKASH WARREN  [address]   [place] [address]        Dear Aakash:     I m your care coordinator. I ve been unable to reach you by phone. I am writing to ask you or an authorized representative to call me at [phone]. If you reach my voicemail, please leave a message with your daytime telephone number. . Include a date and time that I can call you. If you are hearing impaired, call the [place] [employer] at [phone] or [phone] (xbgtzd-[id number] relay service).     The reason I am trying to reach you is:    [] To schedule an assessment  [x] For your six (6)-month check-in  [] Other:      Please call me as soon as you receive this letter. I look forward to speaking with you.    Sincerely,    ELADIA Morrison  [phone]  [email or web address] [place] is a health plan that contracts with both Medicare and the [place] Medical Assistance (Medicaid) program to provide benefits of both programs to enrollees. Enrollment in [facility] depends on contract renewal.    [id number] accepted  [id number]                                                                         A (08/2022)

## 2023-08-25 NOTE — PROGRESS NOTES
Left voicemails for member on 8/21, 8/23, and 8/25 for a 6 months follow-up. Will be sending letter if no return call by 8/29/23.     ELADIA Morrison  [facility]  [phone]

## 2023-08-29 NOTE — PROGRESS NOTES
"[facility] Care Coordination Contact    Per CC, mailed client an \"Unable to Contact\" letter.    Rosio Marinelli  [facility]  Case Management Specialist  [phone]       "

## 2023-10-16 DIAGNOSIS — N40.1 BENIGN NON-NODULAR PROSTATIC HYPERPLASIA WITH LOWER URINARY TRACT SYMPTOMS: ICD-10-CM

## 2023-10-16 NOTE — TELEPHONE ENCOUNTER
Medication Question or Refill    Contacts         Type Contact Phone/Fax    10/16/2023 11:42 AM CDT Phone (Incoming) Frank Sharp (Self) [phone] (M)     Ok to leave a detailed voicemail            What medication are you calling about (include dose and sig)?: Flomex 004mg    Preferred Pharmacy:   [facility] [id number] [place] [facility] [place] [place] - [address] [place] [address]  Phone: [phone] Fax: [phone]      Controlled Substance Agreement on file:   CSA -- Patient Level:    CSA: None found at the patient level.       Who prescribed the medication?:     Do you need a refill? Yes    When did you use the medication last? 4 months ago     Patient offered an appointment? No    Do you have any questions or concerns?  No      Okay to leave a detailed message?: Yes at Cell number on file:    Telephone Information:   Mobile [phone]

## 2023-10-17 RX ORDER — TAMSULOSIN HYDROCHLORIDE 0.4 MG/1
CAPSULE ORAL
Qty: 90 CAPSULE | Refills: 1 | Status: SHIPPED | OUTPATIENT
Start: 2023-10-17 | End: 2024-04-19

## 2023-12-20 ENCOUNTER — PATIENT OUTREACH (OUTPATIENT)
Dept: GERIATRIC MEDICINE | Facility: CLINIC | Age: 73
End: 2023-12-20
Payer: COMMERCIAL

## 2023-12-22 ENCOUNTER — PATIENT OUTREACH (OUTPATIENT)
Dept: GERIATRIC MEDICINE | Facility: CLINIC | Age: 73
End: 2023-12-22
Payer: COMMERCIAL

## 2023-12-22 NOTE — Clinical Note
FYI, a health risk assessment was offered and refused. Will check back with him again in about 6 months.

## 2023-12-22 NOTE — PROGRESS NOTES
[facility] Refusal Telephone Assessment    Member refused home visit HRA on 12/22/23 (reason: Refused. Does not want to complete the health risk assessment at this time and does not need anything).    ER visits: No  Hospitalizations: No  Health concerns: None reported.   Falls/Injuries: No  ADL/IADL Dependencies:        Member currently receiving the following home care services:     Member currently receiving the following community resources:    Informal support(s):      Advanced Care Planning discussion, complete code section.    [facility] sponsored benefits: Shared information re: Silver Sneakers/gym memberships, ASA, Calcium +D.    Follow-Up Plan: Member informed of future contact, plan to f/u with member with a 6 month telephone assessment and offer a home visit.  Contact information shared with member and family, encouraged member to call with any questions or concerns at any time.    This CC note routed to PCP, Dona Sosa.    ELADIA Morrison  [facility]  [phone]

## 2023-12-22 NOTE — LETTER
January 11, 2024    AAKASH WARREN  [address]   [place] [address]        Dear Aakash:    As a member of [facility], we offer a health risk assessment at no cost to you. I know you don't want to have the assessment right now. If you change your mind, please call me at the number below.    Who performs the health risk assessment?  A [facility] Care Coordinator performs the assessment. Our Care Coordinators can also help you understand your benefits. They can tell you about services to aid you at home, such as managing your care with your doctors if your health worsens.    Our Care Coordinators are here for you if you need:  Support for activities you used to do by yourself (including making meals, bathing, and paying bills)  Equipment for bathroom or home safety  Help finding a new place to live  Information on staying healthy, preventing falls and immunizations    Questions?  If you have questions, or you would like to do the assessment, call me at [phone]. TTY users call [phone]. I'm here from 8am to 5pm. I may reach out to you again soon.      Sincerely,        ELADIA Morrison  [phone]  [email or web address]    <[id number] accepted    [id number] (12/2021)  [id number]>

## 2024-01-11 NOTE — PROGRESS NOTES
"[facility] Care Coordination Contact    Per CC, mailed client a \"Refusal of Home Visit\" letter.    Rosio Marinelli  [facility]  Case Management Specialist  [phone]        "

## 2024-04-19 DIAGNOSIS — N40.1 BENIGN NON-NODULAR PROSTATIC HYPERPLASIA WITH LOWER URINARY TRACT SYMPTOMS: ICD-10-CM

## 2024-04-19 RX ORDER — TAMSULOSIN HYDROCHLORIDE 0.4 MG/1
CAPSULE ORAL
Qty: 30 CAPSULE | Refills: 0 | Status: SHIPPED | OUTPATIENT
Start: 2024-04-19 | End: 2024-05-06

## 2024-05-06 ENCOUNTER — OFFICE VISIT (OUTPATIENT)
Dept: INTERNAL MEDICINE | Facility: CLINIC | Age: 74
End: 2024-05-06
Payer: COMMERCIAL

## 2024-05-06 VITALS
HEART RATE: 81 BPM | WEIGHT: 176 LBS | HEIGHT: 65 IN | DIASTOLIC BLOOD PRESSURE: 62 MMHG | BODY MASS INDEX: 29.32 KG/M2 | RESPIRATION RATE: 14 BRPM | SYSTOLIC BLOOD PRESSURE: 120 MMHG | OXYGEN SATURATION: 97 % | TEMPERATURE: 97.7 F

## 2024-05-06 DIAGNOSIS — R09.81 NASAL CONGESTION: ICD-10-CM

## 2024-05-06 DIAGNOSIS — N40.1 BENIGN NON-NODULAR PROSTATIC HYPERPLASIA WITH LOWER URINARY TRACT SYMPTOMS: ICD-10-CM

## 2024-05-06 DIAGNOSIS — D50.8 OTHER IRON DEFICIENCY ANEMIA: ICD-10-CM

## 2024-05-06 DIAGNOSIS — R05.3 CHRONIC COUGH: ICD-10-CM

## 2024-05-06 DIAGNOSIS — Z77.090 ASBESTOS EXPOSURE: ICD-10-CM

## 2024-05-06 DIAGNOSIS — E83.52 SERUM CALCIUM ELEVATED: ICD-10-CM

## 2024-05-06 DIAGNOSIS — F10.11 ALCOHOL ABUSE, IN REMISSION: ICD-10-CM

## 2024-05-06 DIAGNOSIS — Z23 HIGH PRIORITY FOR 2019-NCOV VACCINE: ICD-10-CM

## 2024-05-06 DIAGNOSIS — Z00.00 ENCOUNTER FOR ANNUAL WELLNESS EXAM IN MEDICARE PATIENT: Primary | ICD-10-CM

## 2024-05-06 DIAGNOSIS — M54.16 LUMBAR RADICULOPATHY: ICD-10-CM

## 2024-05-06 DIAGNOSIS — E78.00 PRIMARY HYPERCHOLESTEROLEMIA: ICD-10-CM

## 2024-05-06 DIAGNOSIS — G47.00 INSOMNIA, UNSPECIFIED TYPE: ICD-10-CM

## 2024-05-06 DIAGNOSIS — Z12.5 SCREENING FOR PROSTATE CANCER: ICD-10-CM

## 2024-05-06 DIAGNOSIS — H53.2 DOUBLE VISION: ICD-10-CM

## 2024-05-06 LAB
BASOPHILS # BLD AUTO: 0 10E3/UL (ref 0–0.2)
BASOPHILS NFR BLD AUTO: 0 %
EOSINOPHIL # BLD AUTO: 0.1 10E3/UL (ref 0–0.7)
EOSINOPHIL NFR BLD AUTO: 1 %
ERYTHROCYTE [DISTWIDTH] IN BLOOD BY AUTOMATED COUNT: 12.7 % (ref 10–15)
HCT VFR BLD AUTO: 38.3 % (ref 40–53)
HGB BLD-MCNC: 13.3 G/DL (ref 13.3–17.7)
IMM GRANULOCYTES # BLD: 0 10E3/UL
IMM GRANULOCYTES NFR BLD: 0 %
LYMPHOCYTES # BLD AUTO: 1.5 10E3/UL (ref 0.8–5.3)
LYMPHOCYTES NFR BLD AUTO: 36 %
MCH RBC QN AUTO: 32.7 PG (ref 26.5–33)
MCHC RBC AUTO-ENTMCNC: 34.7 G/DL (ref 31.5–36.5)
MCV RBC AUTO: 94 FL (ref 78–100)
MONOCYTES # BLD AUTO: 0.4 10E3/UL (ref 0–1.3)
MONOCYTES NFR BLD AUTO: 10 %
NEUTROPHILS # BLD AUTO: 2.2 10E3/UL (ref 1.6–8.3)
NEUTROPHILS NFR BLD AUTO: 52 %
PLATELET # BLD AUTO: 183 10E3/UL (ref 150–450)
RBC # BLD AUTO: 4.07 10E6/UL (ref 4.4–5.9)
TOTAL PROTEIN SERUM FOR ELP: 7.2 G/DL (ref 6.4–8.3)
VIT D+METAB SERPL-MCNC: 37 NG/ML (ref 20–50)
WBC # BLD AUTO: 4.3 10E3/UL (ref 4–11)

## 2024-05-06 PROCEDURE — 80061 LIPID PANEL: CPT | Performed by: INTERNAL MEDICINE

## 2024-05-06 PROCEDURE — 36415 COLL VENOUS BLD VENIPUNCTURE: CPT | Performed by: INTERNAL MEDICINE

## 2024-05-06 PROCEDURE — 82607 VITAMIN B-12: CPT | Performed by: INTERNAL MEDICINE

## 2024-05-06 PROCEDURE — 83550 IRON BINDING TEST: CPT | Performed by: INTERNAL MEDICINE

## 2024-05-06 PROCEDURE — G0439 PPPS, SUBSEQ VISIT: HCPCS | Performed by: INTERNAL MEDICINE

## 2024-05-06 PROCEDURE — 84443 ASSAY THYROID STIM HORMONE: CPT | Performed by: INTERNAL MEDICINE

## 2024-05-06 PROCEDURE — 82306 VITAMIN D 25 HYDROXY: CPT | Performed by: INTERNAL MEDICINE

## 2024-05-06 PROCEDURE — 83540 ASSAY OF IRON: CPT | Performed by: INTERNAL MEDICINE

## 2024-05-06 PROCEDURE — 85025 COMPLETE CBC W/AUTO DIFF WBC: CPT | Performed by: INTERNAL MEDICINE

## 2024-05-06 PROCEDURE — G0103 PSA SCREENING: HCPCS | Performed by: INTERNAL MEDICINE

## 2024-05-06 PROCEDURE — 91320 SARSCV2 VAC 30MCG TRS-SUC IM: CPT | Performed by: INTERNAL MEDICINE

## 2024-05-06 PROCEDURE — 84165 PROTEIN E-PHORESIS SERUM: CPT | Performed by: PATHOLOGY

## 2024-05-06 PROCEDURE — 83970 ASSAY OF PARATHORMONE: CPT | Performed by: INTERNAL MEDICINE

## 2024-05-06 PROCEDURE — 84155 ASSAY OF PROTEIN SERUM: CPT | Mod: 59 | Performed by: INTERNAL MEDICINE

## 2024-05-06 PROCEDURE — 90480 ADMN SARSCOV2 VAC 1/ONLY CMP: CPT | Performed by: INTERNAL MEDICINE

## 2024-05-06 PROCEDURE — 99214 OFFICE O/P EST MOD 30 MIN: CPT | Mod: 25 | Performed by: INTERNAL MEDICINE

## 2024-05-06 PROCEDURE — 80053 COMPREHEN METABOLIC PANEL: CPT | Performed by: INTERNAL MEDICINE

## 2024-05-06 RX ORDER — TAMSULOSIN HYDROCHLORIDE 0.4 MG/1
CAPSULE ORAL
Qty: 30 CAPSULE | Refills: 0 | OUTPATIENT
Start: 2024-05-06

## 2024-05-06 RX ORDER — TAMSULOSIN HYDROCHLORIDE 0.4 MG/1
CAPSULE ORAL
Qty: 90 CAPSULE | Refills: 3 | Status: SHIPPED | OUTPATIENT
Start: 2024-05-06

## 2024-05-06 RX ORDER — FLUTICASONE PROPIONATE 50 MCG
1 SPRAY, SUSPENSION (ML) NASAL DAILY
Qty: 16 G | Refills: 11 | Status: SHIPPED | OUTPATIENT
Start: 2024-05-06

## 2024-05-06 RX ORDER — LIDOCAINE/PRILOCAINE 2.5 %-2.5%
CREAM (GRAM) TOPICAL PRN
Qty: 30 G | Refills: 4 | Status: SHIPPED | OUTPATIENT
Start: 2024-05-06

## 2024-05-06 RX ORDER — TRAZODONE HYDROCHLORIDE 50 MG/1
TABLET, FILM COATED ORAL
Qty: 60 TABLET | Refills: 5 | Status: SHIPPED | OUTPATIENT
Start: 2024-05-06

## 2024-05-06 RX ORDER — ATORVASTATIN CALCIUM 20 MG/1
TABLET, FILM COATED ORAL
Qty: 90 TABLET | Refills: 3 | Status: SHIPPED | OUTPATIENT
Start: 2024-05-06

## 2024-05-06 SDOH — HEALTH STABILITY: PHYSICAL HEALTH: ON AVERAGE, HOW MANY DAYS PER WEEK DO YOU ENGAGE IN MODERATE TO STRENUOUS EXERCISE (LIKE A BRISK WALK)?: 0 DAYS

## 2024-05-06 SDOH — HEALTH STABILITY: PHYSICAL HEALTH: ON AVERAGE, HOW MANY MINUTES DO YOU ENGAGE IN EXERCISE AT THIS LEVEL?: 0 MIN

## 2024-05-06 ASSESSMENT — SOCIAL DETERMINANTS OF HEALTH (SDOH): HOW OFTEN DO YOU GET TOGETHER WITH FRIENDS OR RELATIVES?: ONCE A WEEK

## 2024-05-06 ASSESSMENT — PATIENT HEALTH QUESTIONNAIRE - PHQ9
SUM OF ALL RESPONSES TO PHQ QUESTIONS 1-9: 7
SUM OF ALL RESPONSES TO PHQ QUESTIONS 1-9: 7
10. IF YOU CHECKED OFF ANY PROBLEMS, HOW DIFFICULT HAVE THESE PROBLEMS MADE IT FOR YOU TO DO YOUR WORK, TAKE CARE OF THINGS AT HOME, OR GET ALONG WITH OTHER PEOPLE: NOT DIFFICULT AT ALL

## 2024-05-06 NOTE — PATIENT INSTRUCTIONS
Due to chronic anemia I recommend having colonoscopy and endoscopy done.    2. Labs today    3. Chest XR today< restart nasal spray to help cough.    4. For thigh pain: can try  topical lidocaine for scar pain and PT for possible disk bulge. If not better, then we will go and MRI of your spine.     5. See and eye doctor.    6. Consider sleep apnea testing.
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
## 2024-05-06 NOTE — LETTER
May 7, 2024      Frank Sharp  [address]   [place] [address]        Dear ,    We are writing to inform you of your test results.    Frank, LDL cholesterol level is elevated. Were you taking your cholesterol medication prior to labs? If yes, let me know and I will increase the dose.    Hemoglobin level is back to normal. Iron level is slightly elevated, please stop iron supplement if taking any.    Kidney,liver, thyroid functions and calcium are normal.    Vitamin D and B12 levels are good.    Prostate marker is normal.    Sugars are slightly pre-diabetic, please cut back on sugar, carbs and try to loose weight.    No monoclonal proteins in blood.    Chest XR is normal. Due to asbestos exposure I'll order CT of chest.    Low back XR shows moderate arthritis. Please schedule PT.    Resulted Orders   Lipid panel reflex to direct LDL Non-fasting   Result Value Ref Range    Cholesterol 251 (H) <200 mg/dL    Triglycerides 117 <150 mg/dL    Direct Measure HDL 61 >=40 mg/dL    LDL Cholesterol Calculated 167 (H) <=100 mg/dL    Non HDL Cholesterol 190 (H) <130 mg/dL    Patient Fasting > 8hrs? Unknown     Narrative    Cholesterol  Desirable:  <200 mg/dL    Triglycerides  Normal:  Less than 150 mg/dL  Borderline High:  150-199 mg/dL  High:  200-499 mg/dL  Very High:  Greater than or equal to 500 mg/dL    Direct Measure HDL  Female:  Greater than or equal to 50 mg/dL   Male:  Greater than or equal to 40 mg/dL    LDL Cholesterol  Desirable:  <100mg/dL  Above Desirable:  100-129 mg/dL   Borderline High:  130-159 mg/dL   High:  160-189 mg/dL   Very High:  >= 190 mg/dL    Non HDL Cholesterol  Desirable:  130 mg/dL  Above Desirable:  130-159 mg/dL  Borderline High:  160-189 mg/dL  High:  190-219 mg/dL  Very High:  Greater than or equal to 220 mg/dL   Iron and iron binding capacity   Result Value Ref Range    Iron 210 (H) 61 - 157 ug/dL    Iron Binding Capacity 300 240 - 430 ug/dL    Iron Sat Index 70 (H) 15  - 46 %   Parathyroid Hormone Intact   Result Value Ref Range    Parathyroid Hormone Intact 46 15 - 65 pg/mL    Narrative    This result was obtained with the Roche Elecsys PTH STAT assay.   This reference range differs from PTH assays used in other [facility] laboratories.   Vitamin D Deficiency   Result Value Ref Range    Vitamin D, Total (25-Hydroxy) 37 20 - 50 ng/mL      Comment:      optimum levels    Narrative    Season, race, dietary intake, and treatment affect the concentration of 25-hydroxy-Vitamin D. Values may decrease during winter months and increase during summer months.    Vitamin D determination is routinely performed by an immunoassay specific for 25 hydroxyvitamin D3.  If an individual is on vitamin D2(ergocalciferol) supplementation, please specify 25 OH vitamin D2 and D3 level determination by LCMSMS test VITD23.     Vitamin B12   Result Value Ref Range    Vitamin B12 495 232 - 1,245 pg/mL   PSA, screen   Result Value Ref Range    Prostate Specific Antigen Screen 1.40 0.00 - 6.50 ng/mL    Narrative    This result is obtained using the Roche Elecsys total PSA method on the maranda [id number] immunoassay analyzer. Results obtained with different assay methods or kits cannot be used interchangeably.   Comprehensive metabolic panel   Result Value Ref Range    Sodium 136 135 - 145 mmol/L      Comment:      Reference intervals for this test were updated on 09/26/2023 to more accurately reflect our healthy population. There may be differences in the flagging of prior results with similar values performed with this method. Interpretation of those prior results can be made in the context of the updated reference intervals.     Potassium 4.3 3.4 - 5.3 mmol/L    Carbon Dioxide (CO2) 23 22 - 29 mmol/L    Anion Gap 11 7 - 15 mmol/L    Urea Nitrogen 24.2 (H) 8.0 - 23.0 mg/dL    Creatinine 1.00 0.67 - 1.17 mg/dL    GFR Estimate 80 >60 mL/min/1.73m2    Calcium 9.3 8.8 - 10.2 mg/dL    Chloride 102 98 - 107 mmol/L     Glucose 107 (H) 70 - 99 mg/dL    Alkaline Phosphatase 75 40 - 150 U/L      Comment:      Reference intervals for this test were updated on 11/14/2023 to more accurately reflect our healthy population. There may be differences in the flagging of prior results with similar values performed with this method. Interpretation of those prior results can be made in the context of the updated reference intervals.    AST 21 0 - 45 U/L      Comment:      Reference intervals for this test were updated on 6/12/2023 to more accurately reflect our healthy population. There may be differences in the flagging of prior results with similar values performed with this method. Interpretation of those prior results can be made in the context of the updated reference intervals.    ALT 21 0 - 70 U/L      Comment:      Reference intervals for this test were updated on 6/12/2023 to more accurately reflect our healthy population. There may be differences in the flagging of prior results with similar values performed with this method. Interpretation of those prior results can be made in the context of the updated reference intervals.      Protein Total 7.6 6.4 - 8.3 g/dL    Albumin 4.6 3.5 - 5.2 g/dL    Bilirubin Total 0.7 <=1.2 mg/dL   TSH with free T4 reflex   Result Value Ref Range    TSH 3.98 0.30 - 4.20 uIU/mL   CBC with platelets and differential   Result Value Ref Range    WBC Count 4.3 4.0 - 11.0 10e3/uL    RBC Count 4.07 (L) 4.40 - 5.90 10e6/uL    Hemoglobin 13.3 13.3 - 17.7 g/dL    Hematocrit 38.3 (L) 40.0 - 53.0 %    MCV 94 78 - 100 fL    MCH 32.7 26.5 - 33.0 pg    MCHC 34.7 31.5 - 36.5 g/dL    RDW 12.7 10.0 - 15.0 %    Platelet Count 183 150 - 450 10e3/uL    % Neutrophils 52 %    % Lymphocytes 36 %    % Monocytes 10 %    % Eosinophils 1 %    % Basophils 0 %    % Immature Granulocytes 0 %    Absolute Neutrophils 2.2 1.6 - 8.3 10e3/uL    Absolute Lymphocytes 1.5 0.8 - 5.3 10e3/uL    Absolute Monocytes 0.4 0.0 - 1.3 10e3/uL     Absolute Eosinophils 0.1 0.0 - 0.7 10e3/uL    Absolute Basophils 0.0 0.0 - 0.2 10e3/uL    Absolute Immature Granulocytes 0.0 <=0.4 10e3/uL   Total Protein, Serum for ELP   Result Value Ref Range    Total Protein Serum for ELP 7.2 6.4 - 8.3 g/dL   Protein Electrophoresis, Serum   Result Value Ref Range    Albumin 4.5 3.7 - 5.1 g/dL    Alpha 1 0.2 0.2 - 0.4 g/dL    Alpha 2 0.6 0.5 - 0.9 g/dL    Beta Globulin 0.8 0.6 - 1.0 g/dL    Gamma Globulin 1.1 0.7 - 1.6 g/dL    Monoclonal Peak 0.0 <=0.0 g/dL    ELP Interpretation       Essentially normal electrophoretic pattern. No obvious monoclonal proteins seen. Pathologic significance requires clinical correlation. Adilson Gutiérrez MD       If you have any questions or concerns, please call the clinic at the number listed above.       Sincerely,      Dona Sosa MD

## 2024-05-07 ENCOUNTER — ANCILLARY PROCEDURE (OUTPATIENT)
Dept: GENERAL RADIOLOGY | Facility: CLINIC | Age: 74
End: 2024-05-07
Attending: INTERNAL MEDICINE
Payer: COMMERCIAL

## 2024-05-07 ENCOUNTER — TELEPHONE (OUTPATIENT)
Dept: INTERNAL MEDICINE | Facility: CLINIC | Age: 74
End: 2024-05-07

## 2024-05-07 DIAGNOSIS — R05.3 CHRONIC COUGH: ICD-10-CM

## 2024-05-07 DIAGNOSIS — M54.16 LUMBAR RADICULOPATHY: ICD-10-CM

## 2024-05-07 LAB
ALBUMIN SERPL BCG-MCNC: 4.6 G/DL (ref 3.5–5.2)
ALBUMIN SERPL ELPH-MCNC: 4.5 G/DL (ref 3.7–5.1)
ALP SERPL-CCNC: 75 U/L (ref 40–150)
ALPHA1 GLOB SERPL ELPH-MCNC: 0.2 G/DL (ref 0.2–0.4)
ALPHA2 GLOB SERPL ELPH-MCNC: 0.6 G/DL (ref 0.5–0.9)
ALT SERPL W P-5'-P-CCNC: 21 U/L (ref 0–70)
ANION GAP SERPL CALCULATED.3IONS-SCNC: 11 MMOL/L (ref 7–15)
AST SERPL W P-5'-P-CCNC: 21 U/L (ref 0–45)
B-GLOBULIN SERPL ELPH-MCNC: 0.8 G/DL (ref 0.6–1)
BILIRUB SERPL-MCNC: 0.7 MG/DL
BUN SERPL-MCNC: 24.2 MG/DL (ref 8–23)
CALCIUM SERPL-MCNC: 9.3 MG/DL (ref 8.8–10.2)
CHLORIDE SERPL-SCNC: 102 MMOL/L (ref 98–107)
CHOLEST SERPL-MCNC: 251 MG/DL
CREAT SERPL-MCNC: 1 MG/DL (ref 0.67–1.17)
DEPRECATED HCO3 PLAS-SCNC: 23 MMOL/L (ref 22–29)
EGFRCR SERPLBLD CKD-EPI 2021: 80 ML/MIN/1.73M2
FASTING STATUS PATIENT QL REPORTED: ABNORMAL
GAMMA GLOB SERPL ELPH-MCNC: 1.1 G/DL (ref 0.7–1.6)
GLUCOSE SERPL-MCNC: 107 MG/DL (ref 70–99)
HDLC SERPL-MCNC: 61 MG/DL
IRON BINDING CAPACITY (ROCHE): 300 UG/DL (ref 240–430)
IRON SATN MFR SERPL: 70 % (ref 15–46)
IRON SERPL-MCNC: 210 UG/DL (ref 61–157)
LDLC SERPL CALC-MCNC: 167 MG/DL
M PROTEIN SERPL ELPH-MCNC: 0 G/DL
NONHDLC SERPL-MCNC: 190 MG/DL
POTASSIUM SERPL-SCNC: 4.3 MMOL/L (ref 3.4–5.3)
PROT PATTERN SERPL ELPH-IMP: NORMAL
PROT SERPL-MCNC: 7.6 G/DL (ref 6.4–8.3)
PSA SERPL DL<=0.01 NG/ML-MCNC: 1.4 NG/ML (ref 0–6.5)
PTH-INTACT SERPL-MCNC: 46 PG/ML (ref 15–65)
SODIUM SERPL-SCNC: 136 MMOL/L (ref 135–145)
TRIGL SERPL-MCNC: 117 MG/DL
TSH SERPL DL<=0.005 MIU/L-ACNC: 3.98 UIU/ML (ref 0.3–4.2)
VIT B12 SERPL-MCNC: 495 PG/ML (ref 232–1245)

## 2024-05-07 PROCEDURE — 71046 X-RAY EXAM CHEST 2 VIEWS: CPT | Mod: TC | Performed by: RADIOLOGY

## 2024-05-07 PROCEDURE — 72100 X-RAY EXAM L-S SPINE 2/3 VWS: CPT | Mod: TC | Performed by: RADIOLOGY

## 2024-05-08 NOTE — TELEPHONE ENCOUNTER
Please let pt know:     Frank, LDL cholesterol level is elevated. Were you taking your cholesterol medication prior to labs? If yes, let me know and I will increase the dose.     Hemoglobin level is back to normal. Iron level is slightly elevated, please stop iron supplement if taking any.     Kidney,liver, thyroid functions and calcium are normal.     Vitamin D and B12 levels are good.     Prostate marker is normal.     Sugars are slightly pre-diabetic, please cut back on sugar, carbs and try to loose weight.     Chest XR is normal. Due to asbestos exposure I'll order CT of chest.     Low back XR shows moderate arthritis. Please schedule PT.

## 2024-05-08 NOTE — TELEPHONE ENCOUNTER
Relayed provider results message to patient.  Patient does not take iron supplement but takes a multivitamin.  Patient checked the bottle with writer and iron was not in the multivitamin.  Writer also educated patient on iron rich diet, patient denies eating diet heavy in these items.  Patient reports he was not taking cholesterol medication prior to labs, has since started taking atorvastatin.  Writer provider patient with radiology scheduling line.  Patient states PT did contact him and e will return call to schedule.

## 2024-05-14 ENCOUNTER — TELEPHONE (OUTPATIENT)
Dept: INTERNAL MEDICINE | Facility: CLINIC | Age: 74
End: 2024-05-14
Payer: COMMERCIAL

## 2024-05-14 DIAGNOSIS — E78.00 PRIMARY HYPERCHOLESTEROLEMIA: Primary | ICD-10-CM

## 2024-05-14 NOTE — TELEPHONE ENCOUNTER
Patient Returning Call    Reason for call:  Patient say that he was not taking cholesterol medication     Information relayed to patient:      Patient has additional questions:  No      Okay to leave a detailed message?: Yes at Cell number on file:    Telephone Information:   Mobile [phone]

## 2024-05-15 NOTE — TELEPHONE ENCOUNTER
RN attempted to call patient. When patient calls back please find out when he last took his cholesterol medication per Dr. Sosa's note. Please also verify the medication and dosage that he has as well.     Medication we have on file:     atorvastatin (LIPITOR) 20 MG tablet TAKE 1 TABLET DAILY

## 2024-05-17 NOTE — TELEPHONE ENCOUNTER
Spoke with patient and relayed information below from Dr Sosa. Patient verbalized understanding and has no further questions.

## 2024-05-17 NOTE — TELEPHONE ENCOUNTER
Spoke with patient who states he is now taking his cholesterol medication. States he was not taking it previously because he was out and was told he could not get any refills without seeing Dr Sosa, which he did.     States he would now like a referral to a dietician to help him with his diet to get his cholesterol back down. States he is not sure what to eat as everything he has found in his own research seems to contradict itself.

## 2024-05-17 NOTE — TELEPHONE ENCOUNTER
Please let him know, since he does not have diabetes, insurance will not cover nutrition visit.    He should continue on Lipitor every day and he should avoid red meat (muscle mass of omeprazole) and too much animal fats from things like butter and cheese.    He can eat plant-based fats from olive oil and nuts and vegetables.    He can eat fish and low-fat poultry.

## 2024-07-17 ENCOUNTER — PATIENT OUTREACH (OUTPATIENT)
Dept: GERIATRIC MEDICINE | Facility: CLINIC | Age: 74
End: 2024-07-17
Payer: COMMERCIAL

## 2024-07-23 NOTE — PROGRESS NOTES
[facility] Six-Month Telephone Assessment    6 month telephone assessment completed on 7/17/24.    ER visits: No  Hospitalizations: No  TCU stays: No  Significant health status changes: None reported.   Falls/Injuries: No  ADL/IADL changes: No  Changes in services: No    Caregiver Assessment follow up:  N/A    Goals: See POC in chart for goal progress documentation.      HRA offered but member refused at this time. Will offer visit again in November.     Will see member in 6 months for an annual health risk assessment.   Encouraged member to call CC with any questions or concerns in the meantime.     ELADIA Morrison  [facility]  [phone]

## 2024-11-14 ENCOUNTER — TRANSFERRED RECORDS (OUTPATIENT)
Dept: HEALTH INFORMATION MANAGEMENT | Facility: CLINIC | Age: 74
End: 2024-11-14
Payer: COMMERCIAL

## 2024-11-21 ENCOUNTER — PATIENT OUTREACH (OUTPATIENT)
Dept: GERIATRIC MEDICINE | Facility: CLINIC | Age: 74
End: 2024-11-21
Payer: COMMERCIAL

## 2024-11-21 NOTE — LETTER
November 25, 2024    AAKASH WARREN  [address]   [place] [address]    Dear Aakash:     I m your care coordinator. I ve been unable to reach you by phone. I am writing to ask you or your authorized representative to call me at [phone]. If you reach my voicemail, leave a message with your daytime phone number. Include a date and time that I can call you. If you are hearing impaired, call the [place] Relay at 857 or [phone] ([id number] relay service).    The reason I am trying to reach you is:     [x] To schedule an assessment  [] For your six (6)-month check-in  [] Other:      Please call me as soon as you receive this letter. I look forward to speaking with you.    Sincerely,    ELADIA Morrison  [phone]  [email or web address]                                                                   <[id number]>    (08/2024)

## 2024-11-25 NOTE — PROGRESS NOTES
11/22/24    Called member but received an automated message stating to call back later. Unable to leave a voicemail.     ELADIA Morrison  [facility]  [phone]

## 2024-11-25 NOTE — PROGRESS NOTES
[facility] Care Coordination Contact    11/21/24    Called member to schedule annual HRA home visit. Called the number [phone] and received an automated message that the number is no longer in service. Reviewed Epic and called the number [phone] several times but received an automated message to call back later. Unable to leave a voicemail.     ELADIA Morrison  [facility]  [phone]

## 2024-11-26 NOTE — PROGRESS NOTES
"[facility] Care Coordination Contact    Per CC, mailed client an \"Unable to Contact\" letter.    Rosio Marinelli  [facility]  Case Management Specialist  [phone]          "

## 2024-12-10 ENCOUNTER — PATIENT OUTREACH (OUTPATIENT)
Dept: GERIATRIC MEDICINE | Facility: CLINIC | Age: 74
End: 2024-12-10
Payer: COMMERCIAL

## 2024-12-10 NOTE — Clinical Note
DAYAMI, unable to reach this member to complete the annual health risk assessment after 4 attempts.

## 2024-12-19 NOTE — PROGRESS NOTES
[facility] Care Coordination Contact    Completed 4 attempts to reach client with no response.  Member is officially unable to contact effective today, 12/10/24.  Completed health plan required [facility] POC.    Follow-up Plan: CC will attempt to reach member in six months.    This CC note routed to PCP, Dona Sosa.    ELADIA Morrison  [facility]  [phone]

## 2025-03-05 ENCOUNTER — ORDERS ONLY (AUTO-RELEASED) (OUTPATIENT)
Dept: INTERNAL MEDICINE | Facility: CLINIC | Age: 75
End: 2025-03-05
Payer: COMMERCIAL

## 2025-03-05 DIAGNOSIS — Z12.11 COLON CANCER SCREENING: ICD-10-CM

## 2025-03-11 ENCOUNTER — OFFICE VISIT (OUTPATIENT)
Dept: DERMATOLOGY | Facility: CLINIC | Age: 75
End: 2025-03-11
Payer: COMMERCIAL

## 2025-03-11 DIAGNOSIS — L98.9 SKIN LESION: ICD-10-CM

## 2025-03-11 DIAGNOSIS — D48.5 NEOPLASM OF UNCERTAIN BEHAVIOR OF SKIN: ICD-10-CM

## 2025-03-11 NOTE — LETTER
3/11/2025      Frank Sharp  [address]  [place] [address]      Dear Colleague,    Thank you for referring your patient, Frank Sharp, to the [facility]. Please see a copy of my visit note below.    [facility] Dermatology Note  Encounter Date: Mar 11, 2025  Office Visit     Dermatology Problem List:  1. 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 D48.5   DDX: sBCC vs BLK vs iSK  - Reviewed reason for biopsy today, procedure and risks/benefits of biopsy and observation. Verbal consent was obtained.  - A photo was taken for the chart.     #. Irritated Keratosis, disturbance of skin sensation  - Reviewed nature and etiology.  - Monitoring vs cryotherapy. Patient opted for monitoring today    #Actinic vs irritant cheilitis, lower lip  - Venous lake on the lower lip. Small foci of irritation. No discrete lesion concerning for NMSC or AK at this time.  - Trial of Dave's Cortibalm, provided samples to patient.   - Vaseline Ad jerzy. Especially before bed.    Procedures Performed:   - Shave biopsy procedure note, location(s): Right back.  After discussion of benefits and risks including but not limited to bleeding, infection, scar, incomplete removal, recurrence, and non-diagnostic biopsy, verbal consent and photographs were obtained. The area was cleaned with isopropyl alcohol. 0.5mL of 1% lidocaine with epinephrine was injected to obtain adequate anesthesia of lesion(s). Shave biopsy at site(s) performed. Hemostasis was achieved with aluminium chloride. Petrolatum ointment and a sterile dressing were applied. The patient tolerated the procedure and no complications were noted. The patient was provided with verbal and written post care instructions.       Follow-up: prn for new or changing lesions, or per biopsy results    Staff:     Zohra Azul MD PhD  Dermatology,  Dermatopathology    ____________________________________________    CC: Derm Problem (Rash on back x 1 week )      HPI:  Frank Sharp is a(n) 74 year old female who presents today as a new patient for evaluation of spot check.   Saw PCP.   Using alcohol on the spot.   Notices irritation on lips, some crusting. Went away with shaving, then returned. On mucous lip and lower cutaneous lip. Not applying anything.    Patient is otherwise feeling well, without additional skin concerns.     Labs Reviewed:    Lab Results   Component Value Date    WBC 5.3 02/28/2025    HGB 12.8 (L) 02/28/2025    HCT 36.8 (L) 02/28/2025     02/28/2025     02/28/2025    POTASSIUM 4.0 02/28/2025    CHLORIDE 100 02/28/2025    CO2 27 02/28/2025    BUN 15.0 02/28/2025    CR 1.00 02/28/2025     (H) 02/28/2025    AST 21 05/06/2024    ALT 21 05/06/2024    ALKPHOS 75 05/06/2024    BILITOTAL 0.7 05/06/2024        Physical Exam:  Vitals: There were no vitals taken for this visit.  SKIN: Waist-up skin, which includes the head/face, neck, both arms, chest, back, abdomen, digits and/or nails was examined.  - Right back with pink thin papule, with side lighting - biopsy today  - scattered Sks  - Sebaceous hyperplasia  - Lower lip with venous lake and some scale, lower cutaneous lip with faint erythema    - No other lesions of concern on areas examined.       Medications:  Current Outpatient Medications   Medication Sig Dispense Refill     atorvastatin (LIPITOR) 20 MG tablet TAKE 1 TABLET DAILY 90 tablet 3     azelastine (ASTELIN) 0.1 % nasal spray 2 sprays each nostril 1-2x daily as needed for nasal congestion (use nightly for first 2 week) 30 mL 11     fluticasone (FLONASE) 50 MCG/ACT nasal spray Spray 1 spray into both nostrils daily. 16 g 11     tamsulosin (FLOMAX) 0.4 MG capsule TAKE 1 CAPSULE DAILY AFTER SUPPER 90 capsule 3     No current facility-administered medications for this visit.      Past Medical History:   Patient Active  Problem List   Diagnosis     Back Pain     Essential Hypercholesterolemia     Visual Impairment In The Right Eye     Chronic sinusitis     Insomnia due to medical condition     Chronic pain of both knees     Benign non-nodular prostatic hyperplasia with lower urinary tract symptoms     Hyponatremia     Elevated prostate specific antigen (PSA)     Cataract     Chronic low back pain     Family history of diabetes mellitus     Osteoarthrosis     Reactive depression (situational)     Tennis elbow     Primary hypercholesterolemia       Past Medical History:   Diagnosis Date     Hypercholesteremia        CC: Primary Care Provider: Dona Sosa or Referring Provider: Referred Self      Again, thank you for allowing me to participate in the care of your patient.        Sincerely,        Zohra Azul MD    Electronically signed

## 2025-03-11 NOTE — PROGRESS NOTES
[facility] Dermatology Note  Encounter Date: Mar 11, 2025  Office Visit     Dermatology Problem List:  1. 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 D48.5   DDX: sBCC vs BLK vs iSK  - Reviewed reason for biopsy today, procedure and risks/benefits of biopsy and observation. Verbal consent was obtained.  - A photo was taken for the chart.     #. Irritated Keratosis, disturbance of skin sensation  - Reviewed nature and etiology.  - Monitoring vs cryotherapy. Patient opted for monitoring today    #Actinic vs irritant cheilitis, lower lip  - Venous lake on the lower lip. Small foci of irritation. No discrete lesion concerning for NMSC or AK at this time.  - Trial of Red Stamps Cortibalm, provided samples to patient.   - Vaseline Ad jerzy. Especially before bed.    Procedures Performed:   - Shave biopsy procedure note, location(s): Right back.  After discussion of benefits and risks including but not limited to bleeding, infection, scar, incomplete removal, recurrence, and non-diagnostic biopsy, verbal consent and photographs were obtained. The area was cleaned with isopropyl alcohol. 0.5mL of 1% lidocaine with epinephrine was injected to obtain adequate anesthesia of lesion(s). Shave biopsy at site(s) performed. Hemostasis was achieved with aluminium chloride. Petrolatum ointment and a sterile dressing were applied. The patient tolerated the procedure and no complications were noted. The patient was provided with verbal and written post care instructions.       Follow-up: prn for new or changing lesions, or per biopsy results    Staff:     Zohra Azul MD PhD  Dermatology, Dermatopathology    ____________________________________________    CC: Derm Problem (Rash on back x 1 week )      HPI:  Frank Sharp is a(n) 74 year old female who presents today as a new patient for evaluation of spot check.   Saw PCP.   Using alcohol on  the spot.   Notices irritation on lips, some crusting. Went away with shaving, then returned. On mucous lip and lower cutaneous lip. Not applying anything.    Patient is otherwise feeling well, without additional skin concerns.     Labs Reviewed:    Lab Results   Component Value Date    WBC 5.3 02/28/2025    HGB 12.8 (L) 02/28/2025    HCT 36.8 (L) 02/28/2025     02/28/2025     02/28/2025    POTASSIUM 4.0 02/28/2025    CHLORIDE 100 02/28/2025    CO2 27 02/28/2025    BUN 15.0 02/28/2025    CR 1.00 02/28/2025     (H) 02/28/2025    AST 21 05/06/2024    ALT 21 05/06/2024    ALKPHOS 75 05/06/2024    BILITOTAL 0.7 05/06/2024        Physical Exam:  Vitals: There were no vitals taken for this visit.  SKIN: Waist-up skin, which includes the head/face, neck, both arms, chest, back, abdomen, digits and/or nails was examined.  - Right back with pink thin papule, with side lighting - biopsy today  - scattered Sks  - Sebaceous hyperplasia  - Lower lip with venous lake and some scale, lower cutaneous lip with faint erythema    - No other lesions of concern on areas examined.       Medications:  Current Outpatient Medications   Medication Sig Dispense Refill    atorvastatin (LIPITOR) 20 MG tablet TAKE 1 TABLET DAILY 90 tablet 3    azelastine (ASTELIN) 0.1 % nasal spray 2 sprays each nostril 1-2x daily as needed for nasal congestion (use nightly for first 2 week) 30 mL 11    fluticasone (FLONASE) 50 MCG/ACT nasal spray Spray 1 spray into both nostrils daily. 16 g 11    tamsulosin (FLOMAX) 0.4 MG capsule TAKE 1 CAPSULE DAILY AFTER SUPPER 90 capsule 3     No current facility-administered medications for this visit.      Past Medical History:   Patient Active Problem List   Diagnosis    Back Pain    Essential Hypercholesterolemia    Visual Impairment In The Right Eye    Chronic sinusitis    Insomnia due to medical condition    Chronic pain of both knees    Benign non-nodular prostatic hyperplasia with lower urinary  tract symptoms    Hyponatremia    Elevated prostate specific antigen (PSA)    Cataract    Chronic low back pain    Family history of diabetes mellitus    Osteoarthrosis    Reactive depression (situational)    Tennis elbow    Primary hypercholesterolemia       Past Medical History:   Diagnosis Date    Hypercholesteremia        CC: Primary Care Provider: Dona Sosa or Referring Provider: Referred Self

## 2025-03-11 NOTE — PATIENT INSTRUCTIONS
Wound Care After a Biopsy    What is a skin biopsy?  A skin biopsy allows the doctor to examine a very small piece of tissue under the microscope to determine the diagnosis and the best treatment for the skin condition. A local anesthetic (numbing medicine) is injected with a very small needle into the skin area to be tested. A small piece of skin is taken from the area. Sometimes a suture (stitch) is used.     What are the risks of a skin biopsy?  I will experience scar, bleeding, swelling, pain, crusting and redness. I may experience incomplete removal or recurrence. Risks of this procedure are excessive bleeding, bruising, infection, nerve damage, numbness, thick (hypertrophic or keloidal) scar and non-diagnostic biopsy.    How should I care for my wound for the first 24 hours?  Keep the wound dry and covered for 24 hours  If it bleeds, hold direct pressure on the area for 15 minutes. If bleeding does not stop, call us or go to the emergency room  Avoid strenuous exercise the first 1-2 days or as your doctor instructs you    How should I care for the wound after 24 hours?  After 24 hours, remove the bandage  You may bathe or shower as normal  If you had a scalp biopsy, you can shampoo as usual and can use shower water to clean the biopsy site daily  Clean the wound once a day with gentle soap and water  Do not scrub, be gentle  Apply white petroleum/Vaseline after cleaning the wound with a cotton swab or a clean finger, and keep the site covered with a Bandaid /bandage. Bandages are not necessary with a scalp biopsy  If you are unable to cover the site with a Bandaid /bandage, re-apply ointment 2-3 times a day to keep the site moist. Moisture will help with healing  Avoid strenuous activity for first 1-2 days  Avoid lakes, rivers, pools, and oceans until the stitches are removed or the site is healed    How do I clean my wound?  Wash hands thoroughly with soap or use hand  before all wound care  Clean  the wound with gentle soap and water  Apply white petroleum/Vaseline  to wound after it is clean  Replace the Bandaid /bandage to keep the wound covered for the first few days or as instructed by your doctor  If you had a scalp biopsy, warm shower water to the area on a daily basis should suffice    What should I use to clean my wound?   Cotton-tipped applicators (Qtips )  White petroleum jelly (Vaseline ). Use a clean new container and use Q-tips to apply.  Bandaids  as needed  Gentle soap     How should I care for my wound long term?  Do not get your wound dirty  Keep up with wound care for one week or until the area is healed.  If you have stitches, stitches need to be removed in 10 days. You may return to our clinic for this or you may have it done locally at your doctor s office.  A small scab will form and fall off by itself when the area is completely healed. The area will be red and will become pink in color as it heals. Sun protection is very important for how your scar will turn out. Sunscreen with an SPF 30 or greater is recommended once the area is healed.  You should have some soreness but it should be mild and slowly go away over several days. Talk to your doctor about using tylenol for pain,    When should I call my doctor?  If you have increased:   Pain or swelling  Pus or drainage (clear or slightly yellow drainage is ok)  Temperature over 100F  Spreading redness or warmth around wound    When will I hear about my results?  The biopsy results can take 2 weeks to come back.  Your results will automatically release to [employer] before your provider has even reviewed them.  The clinic will call you with the results, send you a [employer] message, or have you schedule a follow-up clinic or phone time to discuss the results.  Contact our clinics if you do not hear from us in 2 weeks.    Who should I call with questions?  [facility]: [phone]  [facility]  [facility]: [phone]  [facility]: [phone]  For urgent needs outside of business hours call the [facility] at [phone] and ask for the dermatology resident on call

## 2025-03-13 LAB
PATH REPORT.COMMENTS IMP SPEC: NORMAL
PATH REPORT.COMMENTS IMP SPEC: NORMAL
PATH REPORT.FINAL DX SPEC: NORMAL
PATH REPORT.GROSS SPEC: NORMAL
PATH REPORT.MICROSCOPIC SPEC OTHER STN: NORMAL
PATH REPORT.RELEVANT HX SPEC: NORMAL

## 2025-03-19 ENCOUNTER — PATIENT OUTREACH (OUTPATIENT)
Dept: GERIATRIC MEDICINE | Facility: CLINIC | Age: 75
End: 2025-03-19
Payer: COMMERCIAL

## 2025-03-19 NOTE — PROGRESS NOTES
Received a call from member who reported that he recently got a new phone and number. He was going through his mail again and saw my unable to contact letters. I offered an assessment and member accepted. Assessment scheduled for Tuesday, March 25th at 10:30 AM.    Freda Sabillon [facility]  [phone]

## 2025-03-25 ENCOUNTER — PATIENT OUTREACH (OUTPATIENT)
Dept: GERIATRIC MEDICINE | Facility: CLINIC | Age: 75
End: 2025-03-25
Payer: COMMERCIAL

## 2025-03-25 ASSESSMENT — ACTIVITIES OF DAILY LIVING (ADL): DEPENDENT_IADLS:: INDEPENDENT

## 2025-03-26 ENCOUNTER — OFFICE VISIT (OUTPATIENT)
Dept: FAMILY MEDICINE | Facility: CLINIC | Age: 75
End: 2025-03-26
Payer: COMMERCIAL

## 2025-03-26 ENCOUNTER — ANCILLARY PROCEDURE (OUTPATIENT)
Dept: GENERAL RADIOLOGY | Facility: CLINIC | Age: 75
End: 2025-03-26
Payer: COMMERCIAL

## 2025-03-26 VITALS
DIASTOLIC BLOOD PRESSURE: 76 MMHG | HEIGHT: 67 IN | OXYGEN SATURATION: 98 % | SYSTOLIC BLOOD PRESSURE: 136 MMHG | RESPIRATION RATE: 18 BRPM | WEIGHT: 180 LBS | BODY MASS INDEX: 28.25 KG/M2 | HEART RATE: 71 BPM | TEMPERATURE: 97.7 F

## 2025-03-26 DIAGNOSIS — J06.9 URI WITH COUGH AND CONGESTION: Primary | ICD-10-CM

## 2025-03-26 DIAGNOSIS — J06.9 URI WITH COUGH AND CONGESTION: ICD-10-CM

## 2025-03-26 PROCEDURE — 71046 X-RAY EXAM CHEST 2 VIEWS: CPT | Mod: TC | Performed by: RADIOLOGY

## 2025-03-26 PROCEDURE — 99213 OFFICE O/P EST LOW 20 MIN: CPT

## 2025-03-26 PROCEDURE — 3078F DIAST BP <80 MM HG: CPT

## 2025-03-26 PROCEDURE — 3075F SYST BP GE 130 - 139MM HG: CPT

## 2025-03-26 PROCEDURE — 1126F AMNT PAIN NOTED NONE PRSNT: CPT

## 2025-03-26 PROCEDURE — G2211 COMPLEX E/M VISIT ADD ON: HCPCS

## 2025-03-26 RX ORDER — ALBUTEROL SULFATE 90 UG/1
2 INHALANT RESPIRATORY (INHALATION) EVERY 6 HOURS PRN
Qty: 18 G | Refills: 0 | Status: SHIPPED | OUTPATIENT
Start: 2025-03-26

## 2025-03-26 RX ORDER — BENZONATATE 100 MG/1
100 CAPSULE ORAL 2 TIMES DAILY PRN
Qty: 30 CAPSULE | Refills: 0 | Status: SHIPPED | OUTPATIENT
Start: 2025-03-26

## 2025-03-26 ASSESSMENT — PAIN SCALES - GENERAL: PAINLEVEL_OUTOF10: NO PAIN (0)

## 2025-03-26 ASSESSMENT — ENCOUNTER SYMPTOMS: COUGH: 1

## 2025-03-26 NOTE — PROGRESS NOTES
Assessment & Plan     (J06.9) URI with cough and congestion  (primary encounter diagnosis)  Comment: Acute and stable. No signs of respiratory distress, vital signs stable, and no red flag signs requiring immediate need for medical attention.  Differential diagnoses include viral upper respiratory illness versus pneumonia.  Not specifically concern for pneumonia considering that lung sounds are clear on physical examination, but considering that symptoms have been present for 12 days without improvement, patient is noting a productive cough, and is experiencing shortness of breath, I do think that a chest x-ray is warranted.  Will move forward treating as though symptoms are related to a viral upper respiratory illness in the meantime with Tessalon Perles for nighttime cough management, and albuterol for daytime coughing.  Will add on doxycycline twice daily for 10 days if chest x-ray indicates concerns for pneumonia.  Otherwise would like patient to begin with current plan of care, and follow-up in 1 week if symptoms are not improving. Offered education on medications including appropriate dosing, possible side effects, and possible adverse effects.  Education given on return to clinic instructions as well as alarm signs that would require the need for immediate medical attention.  Patient attested to understanding.  Plan: XR Chest 2 Views, albuterol (PROAIR         HFA/PROVENTIL HFA/VENTOLIN HFA) 108 (90 Base)         MCG/ACT inhaler, benzonatate (TESSALON) 100 MG         capsule      This progress note has been dictated, with use of voice recognition software. Any grammatical, typographical, or context errors are unintentional and inherent to use of voice recognition software.     Ordering of each unique test  Prescription drug management  I spent a total of 14 minutes on the day of the visit.   Time spent by me today doing chart review, history and exam, documentation and further activities per the  note      FUTURE APPOINTMENTS:       - Follow-up visit in 1 week if symptoms are not improving       - Follow-up for annual visit or as needed  Patient Instructions   Your symptoms are most likely related to a viral infection.  We will get a chest xray today considering how long you have been coughing. This will help to be sure that infection has not spread to your lungs. If the xray shows that pneumonia is a concern, we will need to treat you with antibiotics, otherwise I have listed the remainder of the treatment plan below that will be helpful as you recover from a viral illness.    I have prescribed you an inhaler to help with your coughing. It is called albuterol, and it will help to open up the airways to your lungs. You can use 2 puffs when you are coughing, wheezing, or feeling short of breath. If your symptoms do not improve after a few minutes, you can use 2 more puffs. I would encourage you to be seen in the clinic if you are needing more than 4 puffs to resolve your breathing concerns.     I have also prescribed a medication called Tessalon Perels. This is a cough medication that can be useful to prevent coughing jags overnight, and help you get a good nights rest. This medication can be used during the day, but it can often make people a bit drowsy, so I would encourage you to take it in the evening for the first time to determine how your body reacts to the medication.    Saline spray: I would like you to  with saline spray over-the-counter.  This works best when you lean slightly forward, insert the spray nozzle into your nostril, and direct the nozzle towards the opposite side of your face.  This is sometimes easier to be done in the shower over the sink as it can get a little bit messy.  You will know that you have done this correctly if your eyes slightly water after spraying the solution.  You can do this as prescribed on the box for as long as it takes to treat your symptoms.    Ibuprofen  and Tylenol: You can take ibuprofen and Tylenol as prescribed on the box around the clock.  You can either take them on alternating schedules or you can take them together.  These medications work differently in your body, and are safe to be taken together.    Humidifier: Using a humidifier in the area that you sleep or taking a very hot shower to inhale some of the vaporized steam can help to open up your nasal passages and sinuses and encourage them to drain.    Rest: get adequate rest including 7-8 hours of sleep, and low activity levels during the day to encourage healing. Avoid high impact exercise and rigorous physical labor    Nutrition: support healing by fueling your body with healthy foods. Fruits, vegetables, and whole foods are all great options!    Hydration: increase fluid intake. Illness leads to increased dehydration, so your body needs more fluid intake than it might when you are healthy. [place] and sugar free teas are great options. Try to avoid beverages that cause more dehydration including coffee, soda, energy drinks, and alcohol.     Please follow-up in 1 week if symptoms are not improving    It is important to seek immediate medical attention if you are having symptoms of chest pain, fever, shortness of breath, palpitations, or any changes in your mental status.      Subjective   Frank is a 74 year old, presenting for the following health issues:  Cough and Recheck Medication (Pt reports that he's here to discuss having a cough and sore throat, chest tightness for about 12 days. Pt reports that he's not sleeping and over the counter cough syrup isn't working.)        3/26/2025     2:56 PM   Additional Questions   Roomed by rene   Accompanied by alone         3/26/2025     2:56 PM   Patient Reported Additional Medications   Patient reports taking the following new medications none     History of Present Illness       Reason for visit:  Cough   He is taking medications regularly.      Frank is a  "74-year-old male with a past medical history significant for hypercholesterolemia, chronic sinusitis, insomnia, BPH, and depression who presents today with concerns for illness symptoms.  Patient reports that beginning approximately 12 days ago he began to experience symptoms of coughing, chest congestion, shortness of breath, sore throat, and headaches.  He reports that symptoms have only worsened as time is gone on.  Though he declines any noted fever, abdominal pain, nausea, vomiting, diarrhea, ear pain, or sinus pressure, he does report that he occasionally coughs so hard that he feels he might throw up.  He is having a very hard time sleeping due to persistent coughing.  He has not tested himself at home for COVID, and has not been around anybody else with similar symptoms to the best of his knowledge.  He is taking NyQuil at home for symptom management with little relief.        Review of Systems  Constitutional, HEENT, cardiovascular, pulmonary, gi and gu systems are negative, except as otherwise noted.      Objective    /76   Pulse 71   Temp 97.7  F (36.5  C) (Tympanic)   Resp 18   Ht 1.689 m (5' 6.5\")   Wt 81.6 kg (180 lb)   SpO2 98%   BMI 28.62 kg/m    Body mass index is 28.62 kg/m .  Physical Exam   GENERAL: alert and no distress  EYES: Eyes grossly normal to inspection, PERRL and conjunctivae and sclerae normal  HENT: ear canals and TM's normal, nose and mouth without ulcers or lesions  NECK: no adenopathy, no asymmetry, masses, or scars  RESP: Easy rate, depth, and work of breathing without use of accessory muscles.  Lungs clear to auscultation - no rales, rhonchi or wheezes  CV: regular rate and rhythm, normal S1 S2, no S3 or S4, no murmur, click or rub, no peripheral edema  ABDOMEN: soft, nontender, no hepatosplenomegaly, no masses and bowel sounds normal  MS: no gross musculoskeletal defects noted, no edema    Irma Middleton DNP FNP-C  Family Nurse Practitioner - Same Day " Provider  [facility]        Signed Electronically by: PAMELA Singleton CNP

## 2025-03-26 NOTE — PATIENT INSTRUCTIONS
Your symptoms are most likely related to a viral infection.  We will get a chest xray today considering how long you have been coughing. This will help to be sure that infection has not spread to your lungs. If the xray shows that pneumonia is a concern, we will need to treat you with antibiotics, otherwise I have listed the remainder of the treatment plan below that will be helpful as you recover from a viral illness.    I have prescribed you an inhaler to help with your coughing. It is called albuterol, and it will help to open up the airways to your lungs. You can use 2 puffs when you are coughing, wheezing, or feeling short of breath. If your symptoms do not improve after a few minutes, you can use 2 more puffs. I would encourage you to be seen in the clinic if you are needing more than 4 puffs to resolve your breathing concerns.     I have also prescribed a medication called Tessalon Perels. This is a cough medication that can be useful to prevent coughing jags overnight, and help you get a good nights rest. This medication can be used during the day, but it can often make people a bit drowsy, so I would encourage you to take it in the evening for the first time to determine how your body reacts to the medication.    Saline spray: I would like you to  with saline spray over-the-counter.  This works best when you lean slightly forward, insert the spray nozzle into your nostril, and direct the nozzle towards the opposite side of your face.  This is sometimes easier to be done in the shower over the sink as it can get a little bit messy.  You will know that you have done this correctly if your eyes slightly water after spraying the solution.  You can do this as prescribed on the box for as long as it takes to treat your symptoms.    Ibuprofen and Tylenol: You can take ibuprofen and Tylenol as prescribed on the box around the clock.  You can either take them on alternating schedules or you can take them  together.  These medications work differently in your body, and are safe to be taken together.    Humidifier: Using a humidifier in the area that you sleep or taking a very hot shower to inhale some of the vaporized steam can help to open up your nasal passages and sinuses and encourage them to drain.    Rest: get adequate rest including 7-8 hours of sleep, and low activity levels during the day to encourage healing. Avoid high impact exercise and rigorous physical labor    Nutrition: support healing by fueling your body with healthy foods. Fruits, vegetables, and whole foods are all great options!    Hydration: increase fluid intake. Illness leads to increased dehydration, so your body needs more fluid intake than it might when you are healthy. Knutson and sugar free teas are great options. Try to avoid beverages that cause more dehydration including coffee, soda, energy drinks, and alcohol.     Please follow-up in 1 week if symptoms are not improving    It is important to seek immediate medical attention if you are having symptoms of chest pain, fever, shortness of breath, palpitations, or any changes in your mental status.

## 2025-03-31 ENCOUNTER — TELEPHONE (OUTPATIENT)
Dept: INTERNAL MEDICINE | Facility: CLINIC | Age: 75
End: 2025-03-31
Payer: COMMERCIAL

## 2025-04-08 ENCOUNTER — TELEPHONE (OUTPATIENT)
Dept: INTERNAL MEDICINE | Facility: CLINIC | Age: 75
End: 2025-04-08
Payer: COMMERCIAL

## 2025-04-08 NOTE — TELEPHONE ENCOUNTER
April 8, 2025    Reasonable Accommodation Request Verification  was received via fax for Dr. Sosa.  Patient label was attached to paperwork and placed in provider's inbox to be signed.    Smita Hernandez

## 2025-04-09 NOTE — PROGRESS NOTES
[facility] Home Visit Assessment     Home visit for Health Risk Assessment with Frank Sharp completed on March 25, 2025    Type of residence:: Apartment - handicap accessible  Current living arrangement:: I live alone     Assessment completed with:: Patient    Current Care Plan  Member currently receiving the following home care services:     Member currently receiving the following community resources: None      Medication Review  Medication reconciliation completed in Epic: Yes  Medication set-up & administration: Independent and sets up on own weekly.  Self-administers medications.  Medication Risk Assessment Medication (1 or more, place referral to MTM): N/A: No risk factors identified  MTM Referral Placed: No: No risk factors idenified    Mental/Behavioral Health   Depression Screening:   PHQ-2 Total Score (Adult) - Positive if 3 or more points; Administer PHQ-9 if positive: 2       Mental health DX:: Yes   Mental health DX how managed:: None    Falls Assessment:   Fallen 2 or more times in the past year?: No   Any fall with injury in the past year?: No    ADL/IADL Dependencies:   Dependent ADLs:: Independent  Dependent IADLs:: Independent    Health Plan sponsored benefits: [facility]: Shared information regarding [employer]. Reviewed preventative health screening and health plan supplemental benefits/incentives. Reviewed medication disposal form and transition of care member handout.    CFSS Assessment completed at visit: No     Elderly Waiver Eligibility: No-does not meet criteria    Care Plan & Recommendations: Member will continue to take his medication as prescribed and meet with his PCP as needed. Member will call CC with any questions, concerns, or changes in need.    [place] detailed [place] information.    Follow-Up Plan: Member informed of future contact, plan to f/u with member with a 6 month telephone assessment.  Contact information shared with member  and family, encouraged member to call with any questions or concerns at any time.    [facility] care continuum providers: Please see Snapshot and Care Management Flowsheets for Specific details of care plan.    This CC note routed to PCP, Dona Sosa.    ELADIA Morrison  [facility]  [phone]

## 2025-04-24 ENCOUNTER — PATIENT OUTREACH (OUTPATIENT)
Dept: GERIATRIC MEDICINE | Facility: CLINIC | Age: 75
End: 2025-04-24
Payer: COMMERCIAL

## 2025-04-24 NOTE — PROGRESS NOTES
[facility] Care Coordination Contact    Received after visit chart from care coordinator.  Completed following tasks: Mailed copy of support plan to member, Mailed [place] Cho[place] signature sheet pages 3-4, Mailed Safe Medication Disposal , and Mailed Transition of Care Member Handout    Rosio Marinelli  [facility]  Senior Care Management Specialist  [phone]

## 2025-04-24 NOTE — LETTER
April 24, 2025       AAKASH WARREN  [address]   [place] [address]      Dear Aakash,    At [facility], we re dedicated to improving your health and wellness. Enclosed is the Support Plan developed with you on 3/25/25. Please review the Support Plan carefully.    As a reminder, during your visit we talked about:   Ways to manage your physical and mental health   Using health care to maintain and improve your health    Your preventive care needs      Remember to contact your care coordinator if you:   Are hospitalized or plan to be hospitalized    Have a fall     Have a change in your physical or mental health   Need help finding support or services    If you have questions or don t agree with your Support Plan, call me at [phone]. You can also call me if your needs change. TTY users call the [place] Relay at 573 or 1-679.471.9417 (fslaxj-np-fqptpi relay service).    Sincerely,       ELADIA Morrison  [phone]  [email or web address]                [id number] accepted     (06/2024)                [address] [place] [address]  [phone]  fax [phone]  [facility]

## 2025-05-17 DIAGNOSIS — N40.1 BENIGN NON-NODULAR PROSTATIC HYPERPLASIA WITH LOWER URINARY TRACT SYMPTOMS: ICD-10-CM

## 2025-05-19 RX ORDER — TAMSULOSIN HYDROCHLORIDE 0.4 MG/1
CAPSULE ORAL
Qty: 90 CAPSULE | Refills: 2 | Status: SHIPPED | OUTPATIENT
Start: 2025-05-19 | End: 2025-05-20

## 2025-05-20 ENCOUNTER — TELEPHONE (OUTPATIENT)
Dept: INTERNAL MEDICINE | Facility: CLINIC | Age: 75
End: 2025-05-20
Payer: COMMERCIAL

## 2025-05-20 DIAGNOSIS — N40.1 BENIGN NON-NODULAR PROSTATIC HYPERPLASIA WITH LOWER URINARY TRACT SYMPTOMS: ICD-10-CM

## 2025-05-20 DIAGNOSIS — E78.00 PURE HYPERCHOLESTEROLEMIA, UNSPECIFIED: Primary | ICD-10-CM

## 2025-05-20 RX ORDER — TAMSULOSIN HYDROCHLORIDE 0.4 MG/1
CAPSULE ORAL
Qty: 90 CAPSULE | Refills: 3 | Status: SHIPPED | OUTPATIENT
Start: 2025-05-20

## 2025-05-20 RX ORDER — ATORVASTATIN CALCIUM 20 MG/1
TABLET, FILM COATED ORAL
Qty: 90 TABLET | Refills: 3 | Status: SHIPPED | OUTPATIENT
Start: 2025-05-20

## 2025-05-20 NOTE — TELEPHONE ENCOUNTER
Reason for Call:  Appointment Request    Patient requesting this type of appt:  Preventive     Requested provider: Dona Sosa    Reason patient unable to be scheduled: Not within requested timeframe    When does patient want to be seen/preferred time: 3-7 days    Comments: Patient is wanting to be seen for annual check up next Wednesday 05/28/2025 due to medications almost being gone.     Okay to leave a detailed message?: Yes at Cell number on file:    Telephone Information:   Mobile [phone]       Call taken on 5/20/2025 at 2:51 PM by Amaris Acuna

## 2025-05-20 NOTE — TELEPHONE ENCOUNTER
I called and scheduled pt AWV on 6/11. Pt is wondering if he can get a short refill of Tamsulosin and Atorvastatin until his appt, he is running out and will be out by 5/28

## 2025-06-11 ENCOUNTER — ORDERS ONLY (AUTO-RELEASED) (OUTPATIENT)
Dept: INTERNAL MEDICINE | Facility: CLINIC | Age: 75
End: 2025-06-11

## 2025-06-11 ENCOUNTER — OFFICE VISIT (OUTPATIENT)
Dept: INTERNAL MEDICINE | Facility: CLINIC | Age: 75
End: 2025-06-11
Payer: COMMERCIAL

## 2025-06-11 ENCOUNTER — RESULTS FOLLOW-UP (OUTPATIENT)
Dept: INTERNAL MEDICINE | Facility: CLINIC | Age: 75
End: 2025-06-11

## 2025-06-11 VITALS
BODY MASS INDEX: 29.31 KG/M2 | OXYGEN SATURATION: 95 % | SYSTOLIC BLOOD PRESSURE: 131 MMHG | DIASTOLIC BLOOD PRESSURE: 73 MMHG | HEART RATE: 82 BPM | TEMPERATURE: 97.7 F | HEIGHT: 65 IN | WEIGHT: 175.9 LBS | RESPIRATION RATE: 13 BRPM

## 2025-06-11 DIAGNOSIS — Z60.4 SOCIAL ISOLATION: ICD-10-CM

## 2025-06-11 DIAGNOSIS — Z00.00 ENCOUNTER FOR MEDICARE ANNUAL WELLNESS EXAM: Primary | ICD-10-CM

## 2025-06-11 DIAGNOSIS — H90.8 MIXED CONDUCTIVE AND SENSORINEURAL HEARING LOSS, UNSPECIFIED LATERALITY: ICD-10-CM

## 2025-06-11 DIAGNOSIS — D64.9 ANEMIA, UNSPECIFIED TYPE: ICD-10-CM

## 2025-06-11 DIAGNOSIS — Z78.9 EPISODE OF BINGE CONSUMPTION OF ALCOHOL: ICD-10-CM

## 2025-06-11 DIAGNOSIS — Z13.6 SCREENING FOR AAA (ABDOMINAL AORTIC ANEURYSM): ICD-10-CM

## 2025-06-11 DIAGNOSIS — Z12.11 COLON CANCER SCREENING: ICD-10-CM

## 2025-06-11 DIAGNOSIS — T16.2XXD FOREIGN BODY OF LEFT EAR, SUBSEQUENT ENCOUNTER: ICD-10-CM

## 2025-06-11 DIAGNOSIS — E78.5 HYPERLIPIDEMIA LDL GOAL <100: ICD-10-CM

## 2025-06-11 DIAGNOSIS — Z12.5 SCREENING FOR PROSTATE CANCER: ICD-10-CM

## 2025-06-11 DIAGNOSIS — L30.9 DERMATITIS: ICD-10-CM

## 2025-06-11 LAB
ALBUMIN SERPL BCG-MCNC: 4.5 G/DL (ref 3.5–5.2)
ALP SERPL-CCNC: 85 U/L (ref 40–150)
ALT SERPL W P-5'-P-CCNC: 14 U/L (ref 0–70)
ANION GAP SERPL CALCULATED.3IONS-SCNC: 13 MMOL/L (ref 7–15)
AST SERPL W P-5'-P-CCNC: 24 U/L (ref 0–45)
BASOPHILS # BLD AUTO: 0 10E3/UL (ref 0–0.2)
BASOPHILS NFR BLD AUTO: 1 %
BILIRUB SERPL-MCNC: 0.7 MG/DL
BUN SERPL-MCNC: 15.6 MG/DL (ref 8–23)
CALCIUM SERPL-MCNC: 9.5 MG/DL (ref 8.8–10.4)
CHLORIDE SERPL-SCNC: 100 MMOL/L (ref 98–107)
CHOLEST SERPL-MCNC: 163 MG/DL
CREAT SERPL-MCNC: 0.96 MG/DL (ref 0.67–1.17)
EGFRCR SERPLBLD CKD-EPI 2021: 83 ML/MIN/1.73M2
EOSINOPHIL # BLD AUTO: 0.1 10E3/UL (ref 0–0.7)
EOSINOPHIL NFR BLD AUTO: 2 %
ERYTHROCYTE [DISTWIDTH] IN BLOOD BY AUTOMATED COUNT: 12.9 % (ref 10–15)
FASTING STATUS PATIENT QL REPORTED: NO
FASTING STATUS PATIENT QL REPORTED: NO
GLUCOSE SERPL-MCNC: 93 MG/DL (ref 70–99)
HCO3 SERPL-SCNC: 25 MMOL/L (ref 22–29)
HCT VFR BLD AUTO: 36.7 % (ref 40–53)
HDLC SERPL-MCNC: 62 MG/DL
HGB BLD-MCNC: 13 G/DL (ref 13.3–17.7)
IMM GRANULOCYTES # BLD: 0 10E3/UL
IMM GRANULOCYTES NFR BLD: 0 %
IRON BINDING CAPACITY (ROCHE): 295 UG/DL (ref 240–430)
IRON SATN MFR SERPL: 32 % (ref 15–46)
IRON SERPL-MCNC: 94 UG/DL (ref 61–157)
LDLC SERPL CALC-MCNC: 85 MG/DL
LYMPHOCYTES # BLD AUTO: 2.1 10E3/UL (ref 0.8–5.3)
LYMPHOCYTES NFR BLD AUTO: 38 %
MCH RBC QN AUTO: 33.2 PG (ref 26.5–33)
MCHC RBC AUTO-ENTMCNC: 35.4 G/DL (ref 31.5–36.5)
MCV RBC AUTO: 94 FL (ref 78–100)
MONOCYTES # BLD AUTO: 0.6 10E3/UL (ref 0–1.3)
MONOCYTES NFR BLD AUTO: 11 %
NEUTROPHILS # BLD AUTO: 2.6 10E3/UL (ref 1.6–8.3)
NEUTROPHILS NFR BLD AUTO: 48 %
NONHDLC SERPL-MCNC: 101 MG/DL
PLATELET # BLD AUTO: 186 10E3/UL (ref 150–450)
POTASSIUM SERPL-SCNC: 3.8 MMOL/L (ref 3.4–5.3)
PROT SERPL-MCNC: 7.6 G/DL (ref 6.4–8.3)
PSA SERPL DL<=0.01 NG/ML-MCNC: 1.22 NG/ML (ref 0–6.5)
RBC # BLD AUTO: 3.92 10E6/UL (ref 4.4–5.9)
SODIUM SERPL-SCNC: 138 MMOL/L (ref 135–145)
TRIGL SERPL-MCNC: 81 MG/DL
TSH SERPL DL<=0.005 MIU/L-ACNC: 3.62 UIU/ML (ref 0.3–4.2)
WBC # BLD AUTO: 5.4 10E3/UL (ref 4–11)

## 2025-06-11 PROCEDURE — 80061 LIPID PANEL: CPT | Performed by: INTERNAL MEDICINE

## 2025-06-11 PROCEDURE — 84443 ASSAY THYROID STIM HORMONE: CPT | Performed by: INTERNAL MEDICINE

## 2025-06-11 PROCEDURE — G0103 PSA SCREENING: HCPCS | Performed by: INTERNAL MEDICINE

## 2025-06-11 PROCEDURE — 83540 ASSAY OF IRON: CPT | Performed by: INTERNAL MEDICINE

## 2025-06-11 PROCEDURE — 80053 COMPREHEN METABOLIC PANEL: CPT | Performed by: INTERNAL MEDICINE

## 2025-06-11 PROCEDURE — 85025 COMPLETE CBC W/AUTO DIFF WBC: CPT | Performed by: INTERNAL MEDICINE

## 2025-06-11 PROCEDURE — 36415 COLL VENOUS BLD VENIPUNCTURE: CPT | Performed by: INTERNAL MEDICINE

## 2025-06-11 PROCEDURE — 83550 IRON BINDING TEST: CPT | Performed by: INTERNAL MEDICINE

## 2025-06-11 RX ORDER — TRIAMCINOLONE ACETONIDE 1 MG/G
CREAM TOPICAL 2 TIMES DAILY
Qty: 45 G | Refills: 1 | Status: SHIPPED | OUTPATIENT
Start: 2025-06-11

## 2025-06-11 SDOH — SOCIAL STABILITY - SOCIAL INSECURITY: SOCIAL EXCLUSION AND REJECTION: Z60.4

## 2025-06-11 SDOH — HEALTH STABILITY: PHYSICAL HEALTH: ON AVERAGE, HOW MANY DAYS PER WEEK DO YOU ENGAGE IN MODERATE TO STRENUOUS EXERCISE (LIKE A BRISK WALK)?: 3 DAYS

## 2025-06-11 SDOH — HEALTH STABILITY: PHYSICAL HEALTH: ON AVERAGE, HOW MANY MINUTES DO YOU ENGAGE IN EXERCISE AT THIS LEVEL?: 60 MIN

## 2025-06-11 ASSESSMENT — PATIENT HEALTH QUESTIONNAIRE - PHQ9
SUM OF ALL RESPONSES TO PHQ QUESTIONS 1-9: 3
SUM OF ALL RESPONSES TO PHQ QUESTIONS 1-9: 3
10. IF YOU CHECKED OFF ANY PROBLEMS, HOW DIFFICULT HAVE THESE PROBLEMS MADE IT FOR YOU TO DO YOUR WORK, TAKE CARE OF THINGS AT HOME, OR GET ALONG WITH OTHER PEOPLE: NOT DIFFICULT AT ALL

## 2025-06-11 ASSESSMENT — SOCIAL DETERMINANTS OF HEALTH (SDOH): HOW OFTEN DO YOU GET TOGETHER WITH FRIENDS OR RELATIVES?: NEVER

## 2025-06-11 NOTE — PROGRESS NOTES
Preventive Care Visit  [facility]  Dona Sosa MD, Internal Medicine  Jun 11, 2025      Assessment & Plan     Encounter for Medicare annual wellness exam  Will do labs today, discussed colon cancer screening, aortic aneurysm screening, he is up-to-date on vaccinations  - PSA, screen    Colon cancer screening  Discussed that colonoscopy is the best screening for polyps especially in people of his chronic anemia but he still apprehensive because does not have anyone to pick him up after the procedure, he does not have family or friends currently.  Will continue his Cologuard  - COLOGUARD([employer]); Future    Mixed conductive and sensorineural hearing loss, unspecified laterality  No earwax on exam but does have a piece of gauze stuck in his left ear, will have ENT remove it and if hearing loss continues will need hearing screen.  - Adult ENT  Referral; Future    Foreign body of left ear, subsequent encounter  Piece of gauze stuck in his left ear right next to tympanic membrane, no signs of infection, discussed to keep his ear dry until he sees an ENT for removal  - Adult ENT  Referral; Future    Screening for AAA (abdominal aortic aneurysm)  - US Abdominal Aorta; Future    Hyperlipidemia LDL goal <100  He is on Lipitor  - Lipid panel reflex to direct LDL Non-fasting  - Comprehensive metabolic panel (BMP + Alb, Alk Phos, ALT, AST, Total. Bili, TP)  - TSH with free T4 reflex    Anemia, unspecified type  Endocrine mild, she saw hematology in 2022 who felt that it was alcohol related, we discussed colonoscopy and endoscopy in the past but he wants to defer due to concerns about the prep and needing person to pick him up after procedure.  He agreeable to do Cologuard.  Recent testing for B vitamins was normal.  - CBC with platelets and differential  - Iron and iron binding capacity    Dermatitis  On his right back, next to the well-healed skin biopsy which was benign,  "currently itching but no pain.  He has been putting alcohol on it and irritating it.  Discussed to use triamcinolone cream instead  - triamcinolone (KENALOG) 0.1 % external cream; Apply topically 2 times daily.    Social isolation  He is single, has no family, no friends.  Discussed to try and make more friends.  Due to his dysfunctional alcohol use strongly encouraged him to pursue AA group meetings and consider getting his sponsor through AA group.    Episode of binge consumption of alcohol  Currently drinks a bottle of wine on Friday or Saturday twice a month.      BMI  Estimated body mass index is 29.27 kg/m  as calculated from the following:    Height as of this encounter: 1.651 m (5' 5\").    Weight as of this encounter: 79.8 kg (175 lb 14.4 oz).     The longitudinal plan of care for the diagnosis(es)/condition(s) as documented were addressed during this visit. Due to the added complexity in care, I will continue to support Frank in the subsequent management and with ongoing continuity of care.     Counseling  Appropriate preventive services were addressed with this patient via screening, questionnaire, or discussion as appropriate for fall prevention, nutrition, physical activity, Tobacco-use cessation, social engagement, weight loss and cognition.  Checklist reviewing preventive services available has been given to the patient.  Reviewed patient's diet, addressing concerns and/or questions.   He is at risk for lack of exercise and has been provided with information to increase physical activity for the benefit of his well-being.   Patient is at risk for social isolation and has been provided with information about the benefit of social connection.   The patient was instructed to see the dentist every 6 months.       Hoang Marie is a 75 year old, presenting for the following:  Physical (Annual Wellness) and Sleep Problem (Patient has hard time falling asleep)        6/11/2025     9:48 AM   Additional " Questions   Roomed by Jasmine   Accompanied by Alone          VINCE    Frank is a 75 year old male with h/o hyperlipidemia, low back pain, BPH, history of transiently elevated PSA and moderate alcohol intake, history of shingles, decreased vision, history of shingles who is currently here for wellness exam.    He has  been having problems with hearing in his left ear.  Is any pain, right ear is fine.  On exam today he does have a piece of cotton or gauze visible in his ear and will need ENT to remove it, no earwax impaction.    He did have an episode of disbalance earlier this morning but it is now resolved.  He denies any recurrent episodes or recent falls.  Recent eye exam was in November, he goes in once a year, he is up-to-date on his glasses.      He quit smoking more than 15 years ago.  Currently drinks alcohol 1 bottle of wine over the weekend, usually drinks twice a month, denies any blood in the stool constipation diarrhea abdominal pain or dysphagia.    He tries to walk at least a mile a day and denies any chest pains palpitations or shortness of breath.      Advance Care Planning    Discussed advance care planning with patient; however, patient declined at this time.        6/11/2025   General Health   How would you rate your overall physical health? Good   Feel stress (tense, anxious, or unable to sleep) Not at all         6/11/2025   Nutrition   Diet: Regular (no restrictions)         6/11/2025   Exercise   Days per week of moderate/strenous exercise 3 days   Average minutes spent exercising at this level 60 min         6/11/2025   Social Factors   Frequency of gathering with friends or relatives Never   Worry food won't last until get money to buy more No   Food not last or not have enough money for food? No   Do you have housing? (Housing is defined as stable permanent housing and does not include staying outside in a car, in a tent, in an abandoned building, in an overnight shelter, or couch-surfing.) Yes    Are you worried about losing your housing? No   Lack of transportation? No   Unable to get utilities (heat,electricity)? No   (!) SOCIAL CONNECTIONS CONCERN      6/11/2025   Fall Risk   Fallen 2 or more times in the past year? No    Trouble with walking or balance? No        Proxy-reported          6/11/2025   Activities of Daily Living- Home Safety   Needs help with the following daily activites None of the above   Safety concerns in the home None of the above         6/11/2025   Dental   Dentist two times every year? (!) NO         6/11/2025   Hearing Screening   Hearing concerns? None of the above         6/11/2025   Driving Risk Screening   Patient/family members have concerns about driving No         6/11/2025   General Alertness/Fatigue Screening   Have you been more tired than usual lately? No         6/11/2025   Urinary Incontinence Screening   Bothered by leaking urine in past 6 months No       Today's PHQ-9 Score:       6/11/2025     9:50 AM   PHQ-9 SCORE   PHQ-9 Total Score MyChart 3 (Minimal depression)   PHQ-9 Total Score 3        Proxy-reported         6/11/2025   Substance Use   Alcohol more than 3/day or more than 7/wk No   Do you have a current opioid prescription? No   How severe/bad is pain from 1 to 10? 0/10 (No Pain)   Do you use any other substances recreationally? No     Social History     Tobacco Use    Smoking status: Former    Smokeless tobacco: Former   Vaping Use    Vaping status: Never Used   Substance Use Topics    Alcohol use: Yes     Comment: Alcoholic Drinks/day: None since the New Year    Drug use: No       ASCVD Risk   The 10-year ASCVD risk score (Maryan WINCHESTER, et al., 2019) is: 24.4%    Values used to calculate the score:      Age: 75 years      Sex: Male      Is Non- : No      Diabetic: No      Tobacco smoker: No      Systolic Blood Pressure: 131 mmHg      Is BP treated: No      HDL Cholesterol: 61 mg/dL      Total Cholesterol: 251  "mg/dL      Reviewed and updated as needed this visit by Provider                  Current providers sharing in care for this patient include:  Patient Care Team:  Dona Sosa MD as PCP - General (Internal Medicine)  Freda Sabillon as Lead Care Coordinator (Primary Care - CC)  Dona Sosa MD as Assigned PCP  Roni Sanchez MD (Colon and Rectal Surgery)  Felicitas Nieves, RN as Specialty Care Coordinator (Hematology & Oncology)  Turner Tang MD as MD (Ophthalmology)  Dona Sosa MD as Referring Physician (Internal Medicine)  Harshad Martin MD as MD (Otolaryngology)  Bud Stephens MD as MD (Dermatology)  Zohra Azul MD as Assigned Dermatology Provider    The following health maintenance items are reviewed in Epic and correct as of today:  Health Maintenance   Topic Date Due    DEPRESSION ACTION PLAN  Never done    LUNG CANCER SCREENING  Never done    AORTIC ANEURYSM SCREENING (SYSTEM ASSIGNED)  Never done    ANNUAL REVIEW OF HM ORDERS  07/13/2023    MEDICARE ANNUAL WELLNESS VISIT  05/06/2025    LIPID  05/06/2025    COLORECTAL CANCER SCREENING  07/17/2025    COVID-19 VACCINE (8 - 2024-25 season) 08/28/2025    INFLUENZA VACCINE (Season Ended) 09/01/2025    DTAP/TDAP/TD VACCINE (2 - Td or Tdap) 11/23/2025    PHQ-9  12/11/2025    FALL RISK ASSESSMENT  06/11/2026    DIABETES SCREENING  02/28/2028    ADVANCE CARE PLANNING  05/06/2029    HEPATITIS C SCREENING  Completed    PNEUMOCOCCAL VACCINE 50+ YEARS  Completed    ZOSTER VACCINE  Completed    RSV VACCINE  Completed    HPV VACCINE  Aged Out    MENINGITIS VACCINE  Aged Out     Review of systems: As above, PHQ-9 testing today is neck     Objective    Exam  /73 (BP Location: Left arm, Patient Position: Sitting, Cuff Size: Adult Large)   Pulse 82   Temp 97.7  F (36.5  C) (Oral)   Resp 13   Ht 1.651 m (5' 5\")   Wt 79.8 kg (175 lb 14.4 oz)   SpO2 95%   BMI 29.27 kg/m     Estimated body mass index is 29.27 kg/m  " "as calculated from the following:    Height as of this encounter: 1.651 m (5' 5\").    Weight as of this encounter: 79.8 kg (175 lb 14.4 oz).    Physical Exam  General: well appearing male, alert and oriented x3  EYES: Eyelids, conjunctiva, and sclera were normal. Pupils were normal.   HEAD, EARS, NOSE, MOUTH, AND THROAT: no cervical LAD, no thyromegaly or nodules appreciated. TMs are visualized and normal, oropharynx is clear.  Mild nasal congestion is noted.  RESPIRATORY: respirations non labored, CTA bl, no wheezes, rales, no forced expiratory wheezing.  CARDIOVASCULAR: Heart rate and rhythm were normal. No murmurs, rubs,gallops. There was no peripheral edema. No carotid bruits.  GASTROINTESTINAL: Positive bowel sounds, abdomen is soft, non tender, non distended.     MUSCULOSKELETAL: Muscle mass was normal for age. No joint synovitis or deformity.  LYMPHATIC: There were no enlarged nodes palpable.  SKIN/HAIR/NAILS: Skin color was normal.  Small rash on his right back, next to the well-healed skin biopsy.  NEUROLOGIC: The patient was alert and oriented.  Speech was normal.  There is no facial asymmetry.   PSYCHIATRIC:  Mood and affect were normal.            6/11/2025   Mini Cog   Clock Draw Score 2 Normal   3 Item Recall 3 objects recalled   Mini Cog Total Score 5              Signed Electronically by: Dona Sosa MD    "

## 2025-06-11 NOTE — PATIENT INSTRUCTIONS
Reschedule brain MRI    2. Labs today.    3. Have cologuard test done. If you are able to find a person to pick you up after procedure let me know and I still recommend doing colonoscopy.     4. Find emergency contact. You have HAVE to make at least one friend. Consider joining AA group, try different ones to see where you fit in best, get a sponsor from the , they can be your.      5. Try steroid cream for rash on the back, after 2 weeks use, take 1 week off.     6. See ENT to remove Linch from inside the left year. Do not wet the ear.            Patient Education   Preventive Care Advice   This is general advice given by our system to help you stay healthy. However, your care team may have specific advice just for you. Please talk to your care team about your preventive care needs.  Nutrition  Eat 5 or more servings of fruits and vegetables each day.  Try wheat bread, brown rice and whole grain pasta (instead of white bread, rice, and pasta).  Get enough calcium and vitamin D. Check the label on foods and aim for 100% of the RDA (recommended daily allowance).  Lifestyle  Exercise at least 150 minutes each week  (30 minutes a day, 5 days a week).  Do muscle strengthening activities 2 days a week. These help control your weight and prevent disease.  No smoking.  Wear sunscreen to prevent skin cancer.  Have a dental exam and cleaning every 6 months.  Yearly exams  See your health care team every year to talk about:  Any changes in your health.  Any medicines your care team has prescribed.  Preventive care, family planning, and ways to prevent chronic diseases.  Shots (vaccines)   HPV shots (up to age 26), if you've never had them before.  Hepatitis B shots (up to age 59), if you've never had them before.  COVID-19 shot: Get this shot when it's due.  Flu shot: Get a flu shot every year.  Tetanus shot: Get a tetanus shot every 10 years.  Pneumococcal, hepatitis A, and RSV shots: Ask your care team if you need these  based on your risk.  Shingles shot (for age 50 and up)  General health tests  Diabetes screening:  Starting at age 35, Get screened for diabetes at least every 3 years.  If you are younger than age 35, ask your care team if you should be screened for diabetes.  Cholesterol test: At age 39, start having a cholesterol test every 5 years, or more often if advised.  Bone density scan (DEXA): At age 50, ask your care team if you should have this scan for osteoporosis (brittle bones).  Hepatitis C: Get tested at least once in your life.  STIs (sexually transmitted infections)  Before age 24: Ask your care team if you should be screened for STIs.  After age 24: Get screened for STIs if you're at risk. You are at risk for STIs (including HIV) if:  You are sexually active with more than one person.  You don't use condoms every time.  You or a partner was diagnosed with a sexually transmitted infection.  If you are at risk for HIV, ask about PrEP medicine to prevent HIV.  Get tested for HIV at least once in your life, whether you are at risk for HIV or not.  Cancer screening tests  Cervical cancer screening: If you have a cervix, begin getting regular cervical cancer screening tests starting at age 21.  Breast cancer scan (mammogram): If you've ever had breasts, begin having regular mammograms starting at age 40. This is a scan to check for breast cancer.  Colon cancer screening: It is important to start screening for colon cancer at age 45.  Have a colonoscopy test every 10 years (or more often if you're at risk) Or, ask your provider about stool tests like a FIT test every year or Cologuard test every 3 years.  To learn more about your testing options, visit:   .  For help making a decision, visit:   https://bit.ly/yz69995.  Prostate cancer screening test: If you have a prostate, ask your care team if a prostate cancer screening test (PSA) at age 55 is right for you.  Lung cancer screening: If you are a current or former  smoker ages 50 to 80, ask your care team if ongoing lung cancer screenings are right for you.  For informational purposes only. Not to replace the advice of your health care provider. Copyright   2023 [employer]. All rights reserved. Clinically reviewed by the [facility] Transitions Program. SMARTworks 643486 - REV 01/24.  Relationships for Good Health  Relationships are important for our health and happiness. Social isolation, loneliness and lack of support are bad for your health. Studies show that loneliness can harm health and limit your life span as much as high blood pressure and smoking.   Take some time to reflect on your relationships. Then answer these questions:  Are there people in your life that cause you stress or drain your energy? What can you do to set limits?  ________________________________________________________________________________________________________________________________________________________________________________________________________________________________________________________________________________________________________________________________________________  Who do you enjoy spending time with? Who can you go to for support?  ________________________________________________________________________________________________________________________________________________________________________________________________________________________________________________________________________________________________________________________________________________  What can you do to improve your relationships with  others?  __________________________________________________________________________________________________________________________________________________________________________________________________________________  ______________________________________________________________________________________________________________________________  What do you like most about your relationships with others?  ________________________________________________________________________________________________________________________________________________________________________________________________________________________________________________________________________________________________________________________________________________  My goal: ______________________________________________________________________  I will: ______________________________________________________________________________________________________________________________________________________________________________________________    For informational purposes only. Not to replace the advice of your health care provider. Copyright   2018 [facility]. All rights reserved. Clinically reviewed by [facility]. SMARTworks 836368 - Rev 06/24.

## 2025-06-12 NOTE — TELEPHONE ENCOUNTER
FUTURE VISIT INFORMATION:  Appointment Date: 9/24/25  Appointment Time: 1:30 PM    REFERRAL INFORMATION:  Referring By:   Referring Clinic:   Reason for Visit/Diagnosis: Per Pt, dx hearing loss, ; Foreign body of left ear, subsequent encounter [T16.2XXD] referral from PCP recs in [facility]      NOTES STATUS DETAILS   OFFICE NOTE from referring provider [facility] INTERNAL MEDICINE   6/11/25: Dona Sosa MD    OFFICE NOTE from other specialist [facility] ENT   10/5/22: Harshad Martin MD   5/30/18: Armando Blackwell MD    EAR     AUDIOLOGY NOTES [facility] AUDIOLOGY  5/16/18: María Elena Gonzalez AuD    AUDIOGRAM, TYMPANOGRAM  *graph / tracing raw data* [facility] AUDIOLOGY  5/16/18 audiogram   IMAGES *pertaining images & report*       CT, MRI, PET, NM, US, XRAYS, etc ... [facility]/ PACS 10/21/22 CT sinus

## 2025-06-24 ENCOUNTER — ANCILLARY PROCEDURE (OUTPATIENT)
Dept: ULTRASOUND IMAGING | Facility: CLINIC | Age: 75
End: 2025-06-24
Attending: INTERNAL MEDICINE
Payer: COMMERCIAL

## 2025-06-24 ENCOUNTER — ANCILLARY PROCEDURE (OUTPATIENT)
Dept: MRI IMAGING | Facility: CLINIC | Age: 75
End: 2025-06-24
Attending: INTERNAL MEDICINE
Payer: COMMERCIAL

## 2025-06-24 DIAGNOSIS — H57.02 PUPIL ASYMMETRY: ICD-10-CM

## 2025-06-24 DIAGNOSIS — Z13.6 SCREENING FOR AAA (ABDOMINAL AORTIC ANEURYSM): ICD-10-CM

## 2025-06-24 DIAGNOSIS — H53.2 DIPLOPIA: ICD-10-CM

## 2025-06-24 PROCEDURE — 70551 MRI BRAIN STEM W/O DYE: CPT | Performed by: RADIOLOGY

## 2025-06-24 PROCEDURE — 76775 US EXAM ABDO BACK WALL LIM: CPT | Performed by: RADIOLOGY

## 2025-06-29 ENCOUNTER — RESULTS FOLLOW-UP (OUTPATIENT)
Dept: INTERNAL MEDICINE | Facility: CLINIC | Age: 75
End: 2025-06-29
Payer: COMMERCIAL

## 2025-08-21 ENCOUNTER — TELEPHONE (OUTPATIENT)
Dept: OTOLARYNGOLOGY | Facility: CLINIC | Age: 75
End: 2025-08-21

## 2025-09-24 ENCOUNTER — PRE VISIT (OUTPATIENT)
Dept: OTOLARYNGOLOGY | Facility: CLINIC | Age: 75
End: 2025-09-24